# Patient Record
Sex: FEMALE | Race: WHITE | HISPANIC OR LATINO | Employment: OTHER
[De-identification: names, ages, dates, MRNs, and addresses within clinical notes are randomized per-mention and may not be internally consistent; named-entity substitution may affect disease eponyms.]

---

## 2017-04-25 ENCOUNTER — HOSPITAL (OUTPATIENT)
Dept: OTHER | Age: 77
End: 2017-04-25
Attending: INTERNAL MEDICINE

## 2022-04-02 ENCOUNTER — IMAGING SERVICES (OUTPATIENT)
Dept: OTHER | Age: 82
End: 2022-04-02

## 2022-04-02 ENCOUNTER — EXTERNAL RECORD (OUTPATIENT)
Dept: OTHER | Age: 82
End: 2022-04-02

## 2022-10-29 ENCOUNTER — IMAGING SERVICES (OUTPATIENT)
Dept: OTHER | Age: 82
End: 2022-10-29

## 2022-10-29 ENCOUNTER — EXTERNAL RECORD (OUTPATIENT)
Dept: OTHER | Age: 82
End: 2022-10-29

## 2023-05-13 ENCOUNTER — EXTERNAL RECORD (OUTPATIENT)
Dept: OTHER | Age: 83
End: 2023-05-13

## 2023-05-13 ENCOUNTER — IMAGING SERVICES (OUTPATIENT)
Dept: OTHER | Age: 83
End: 2023-05-13

## 2023-06-03 ENCOUNTER — EXTERNAL LAB (OUTPATIENT)
Dept: HEALTH INFORMATION MANAGEMENT | Facility: OTHER | Age: 83
End: 2023-06-03

## 2023-06-03 ENCOUNTER — EXTERNAL RECORD (OUTPATIENT)
Dept: HEALTH INFORMATION MANAGEMENT | Facility: OTHER | Age: 83
End: 2023-06-03

## 2023-06-03 LAB
ALBUMIN SERPL-MCNC: 4.3 G/DL (ref 3.6–4.6)
ALBUMIN/GLOB SERPL: 1.6 {RATIO} (ref 1.2–2.2)
ALP SERPL-CCNC: 151 U/L (ref 44–121)
ALT SERPL-CCNC: 37 U/L (ref 0–32)
AST SERPL-CCNC: 41 U/L (ref 0–40)
BILIRUB SERPL-MCNC: 0.7 MG/DL (ref 0–1.2)
BUN SERPL-MCNC: 22 MG/DL (ref 8–27)
BUN/CREAT SERPL: 23 (ref 12–28)
CALCIUM SERPL-MCNC: 9.8 MG/DL (ref 8.7–10.3)
CHLORIDE SERPL-SCNC: 97 MMOL/L (ref 96–106)
CO2 SERPL-SCNC: 21 MMOL/L (ref 20–29)
CREAT SERPL-MCNC: 0.97 MG/DL (ref 0.57–1)
GFR SERPLBLD SCHWARTZ-ARVRAT: 58 ML/MIN/1.73
GLOBULIN SER-MCNC: 2.7 G/DL (ref 1.5–4.5)
GLUCOSE SERPL-MCNC: 143 MG/DL (ref 70–99)
HCV AB SER QL: NON REACTIVE
LENGTH OF FAST TIME PATIENT: ABNORMAL H
POTASSIUM SERPL-SCNC: 4.3 MMOL/L (ref 3.5–5.2)
PROT SERPL-MCNC: 7 G/DL (ref 6–8.5)
SODIUM SERPL-SCNC: 134 MMOL/L (ref 134–144)

## 2023-06-22 ENCOUNTER — APPOINTMENT (OUTPATIENT)
Dept: SURGERY | Age: 83
End: 2023-06-22

## 2023-07-06 ENCOUNTER — LAB SERVICES (OUTPATIENT)
Dept: LAB | Age: 83
End: 2023-07-06

## 2023-07-06 ENCOUNTER — OFFICE VISIT (OUTPATIENT)
Dept: SURGERY | Age: 83
End: 2023-07-06

## 2023-07-06 VITALS
OXYGEN SATURATION: 96 % | SYSTOLIC BLOOD PRESSURE: 121 MMHG | DIASTOLIC BLOOD PRESSURE: 72 MMHG | BODY MASS INDEX: 33.32 KG/M2 | HEIGHT: 65 IN | HEART RATE: 96 BPM | WEIGHT: 200 LBS

## 2023-07-06 DIAGNOSIS — K82.8 THICKENING OF WALL OF GALLBLADDER: ICD-10-CM

## 2023-07-06 DIAGNOSIS — K82.8 THICKENING OF WALL OF GALLBLADDER: Primary | ICD-10-CM

## 2023-07-06 PROBLEM — R10.84 GENERALIZED ABDOMINAL PAIN: Status: ACTIVE | Noted: 2023-07-06

## 2023-07-06 LAB
ALBUMIN SERPL-MCNC: 3.9 G/DL (ref 3.6–5.1)
ALP SERPL-CCNC: 172 UNITS/L (ref 45–117)
ALT SERPL-CCNC: 46 UNITS/L
AST SERPL-CCNC: 36 UNITS/L
BILIRUB CONJ SERPL-MCNC: 0.2 MG/DL (ref 0–0.2)
BILIRUB SERPL-MCNC: 0.4 MG/DL (ref 0.2–1)
PROT SERPL-MCNC: 7.6 G/DL (ref 6.4–8.2)

## 2023-07-06 PROCEDURE — 80076 HEPATIC FUNCTION PANEL: CPT | Performed by: CLINICAL MEDICAL LABORATORY

## 2023-07-06 PROCEDURE — 36415 COLL VENOUS BLD VENIPUNCTURE: CPT | Performed by: CLINICAL MEDICAL LABORATORY

## 2023-07-06 PROCEDURE — 99244 OFF/OP CNSLTJ NEW/EST MOD 40: CPT

## 2023-07-06 RX ORDER — ATORVASTATIN CALCIUM 20 MG/1
20 TABLET, FILM COATED ORAL DAILY
COMMUNITY
Start: 2023-06-12

## 2023-07-06 RX ORDER — LISINOPRIL 5 MG/1
5 TABLET ORAL DAILY
COMMUNITY
Start: 2023-06-11

## 2023-07-06 RX ORDER — OXYBUTYNIN CHLORIDE 10 MG/1
10 TABLET, EXTENDED RELEASE ORAL DAILY
COMMUNITY
Start: 2023-06-12

## 2023-07-06 RX ORDER — HYDROCHLOROTHIAZIDE 12.5 MG/1
12.5 TABLET ORAL DAILY
COMMUNITY
Start: 2023-06-20

## 2023-07-06 ASSESSMENT — ENCOUNTER SYMPTOMS
NEUROLOGICAL NEGATIVE: 1
CONSTITUTIONAL NEGATIVE: 1
GASTROINTESTINAL NEGATIVE: 1
RESPIRATORY NEGATIVE: 1
PSYCHIATRIC NEGATIVE: 1
EYES NEGATIVE: 1

## 2023-07-06 ASSESSMENT — PAIN SCALES - GENERAL: PAINLEVEL: 0

## 2023-07-11 ENCOUNTER — TELEPHONE (OUTPATIENT)
Dept: SURGERY | Age: 83
End: 2023-07-11

## 2023-08-08 ENCOUNTER — EXTERNAL RECORD (OUTPATIENT)
Dept: OTHER | Age: 83
End: 2023-08-08

## 2023-08-08 ENCOUNTER — IMAGING SERVICES (OUTPATIENT)
Dept: OTHER | Age: 83
End: 2023-08-08

## 2023-11-15 ENCOUNTER — EXTERNAL RECORD (OUTPATIENT)
Dept: OTHER | Age: 83
End: 2023-11-15

## 2023-11-15 ENCOUNTER — IMAGING SERVICES (OUTPATIENT)
Dept: OTHER | Age: 83
End: 2023-11-15

## 2023-12-13 ENCOUNTER — TELEPHONE (OUTPATIENT)
Dept: CT IMAGING | Age: 83
End: 2023-12-13

## 2023-12-22 DIAGNOSIS — N63.20 LEFT BREAST MASS: Primary | ICD-10-CM

## 2023-12-26 ENCOUNTER — TELEPHONE (OUTPATIENT)
Dept: CT IMAGING | Age: 83
End: 2023-12-26

## 2024-01-03 ENCOUNTER — TELEPHONE (OUTPATIENT)
Dept: CT IMAGING | Age: 84
End: 2024-01-03

## 2024-01-04 ENCOUNTER — HOSPITAL ENCOUNTER (OUTPATIENT)
Dept: CT IMAGING | Age: 84
Discharge: HOME | End: 2024-01-04
Attending: SPECIALIST

## 2024-01-04 DIAGNOSIS — R92.8 ABNORMALITY OF LEFT BREAST ON SCREENING MAMMOGRAM: ICD-10-CM

## 2024-01-04 PROCEDURE — 10006027 HB SUPPLY 278

## 2024-01-04 PROCEDURE — 10002801 HB RX 250 W/O HCPCS: Performed by: RADIOLOGY

## 2024-01-04 PROCEDURE — 76642 ULTRASOUND BREAST LIMITED: CPT

## 2024-01-04 PROCEDURE — A4648 IMPLANTABLE TISSUE MARKER: HCPCS

## 2024-01-04 PROCEDURE — 19083 BX BREAST 1ST LESION US IMAG: CPT

## 2024-01-04 PROCEDURE — 77065 DX MAMMO INCL CAD UNI: CPT

## 2024-01-04 PROCEDURE — 10006023 HB SUPPLY 272

## 2024-01-04 RX ORDER — LIDOCAINE HYDROCHLORIDE 20 MG/ML
INJECTION, SOLUTION INFILTRATION; PERINEURAL PRN
Status: COMPLETED | OUTPATIENT
Start: 2024-01-04 | End: 2024-01-04

## 2024-01-04 RX ADMIN — LIDOCAINE HYDROCHLORIDE 3 ML: 20 INJECTION, SOLUTION INFILTRATION; PERINEURAL at 08:13

## 2024-01-08 LAB
ASR DISCLAIMER: NORMAL
CASE RPRT: NORMAL
CLINICAL INFO: NORMAL
PATH REPORT ADDENDUM.SYNOPTIC DOC: NORMAL
PATH REPORT.FINAL DX SPEC: NORMAL
PATH REPORT.GROSS SPEC: NORMAL

## 2024-01-09 ENCOUNTER — TELEPHONE (OUTPATIENT)
Dept: CT IMAGING | Age: 84
End: 2024-01-09

## 2024-01-09 ENCOUNTER — CANCER NAVIGATOR (OUTPATIENT)
Dept: ONCOLOGY | Age: 84
End: 2024-01-09

## 2024-01-15 ENCOUNTER — TELEPHONE (OUTPATIENT)
Dept: HEMATOLOGY/ONCOLOGY | Facility: HOSPITAL | Age: 84
End: 2024-01-15

## 2024-01-15 NOTE — TELEPHONE ENCOUNTER
Attempted to reach patient to discuss scheduling consultation with Dr. Arora.  Contact number rings and then goes to busy signal.  Will attempt again at a later time.

## 2024-01-17 ENCOUNTER — NURSE NAVIGATOR ENCOUNTER (OUTPATIENT)
Dept: HEMATOLOGY/ONCOLOGY | Facility: HOSPITAL | Age: 84
End: 2024-01-17

## 2024-01-17 ENCOUNTER — OFFICE VISIT (OUTPATIENT)
Dept: SURGERY | Facility: CLINIC | Age: 84
End: 2024-01-17
Payer: MEDICARE

## 2024-01-17 VITALS
HEART RATE: 94 BPM | DIASTOLIC BLOOD PRESSURE: 78 MMHG | SYSTOLIC BLOOD PRESSURE: 139 MMHG | OXYGEN SATURATION: 96 % | WEIGHT: 204 LBS | TEMPERATURE: 98 F | RESPIRATION RATE: 18 BRPM

## 2024-01-17 DIAGNOSIS — D05.12 DUCTAL CARCINOMA IN SITU (DCIS) OF LEFT BREAST: Primary | ICD-10-CM

## 2024-01-17 RX ORDER — HYDROCHLOROTHIAZIDE 12.5 MG/1
12.5 TABLET ORAL DAILY
COMMUNITY

## 2024-01-17 RX ORDER — OMEPRAZOLE 20 MG/1
20 CAPSULE, DELAYED RELEASE ORAL
COMMUNITY

## 2024-01-17 RX ORDER — ATORVASTATIN CALCIUM 20 MG/1
20 TABLET, FILM COATED ORAL DAILY
COMMUNITY

## 2024-01-17 RX ORDER — MELATONIN
1000 DAILY
COMMUNITY

## 2024-01-17 RX ORDER — LISINOPRIL 5 MG/1
5 TABLET ORAL DAILY
COMMUNITY

## 2024-01-17 NOTE — PROGRESS NOTES
Breast Surgery New Patient Consultation    This is the first visit for this 83 year old woman, referred by Dr. 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    History of Present Illness:   Ms. Mercy Ayala is a 83 year old woman who presents with imaging detected left breast DCIS.  She denies any palpable masses, nipple discharge, skin changes or axillary symptoms.  She does not have a known family history of breast cancer.  She has no personal prior history of breast disease or biopsies.  Her workup was conducted at an outside facility.  She had a bilateral screening mammogram in 2023 that was unremarkable.  She had been followed remotely for a mass in the left breast that was thought to be benign.  She presented for her surveillance ultrasound of this mass on November 15, 2023 and was found to have interval enlargement of this mass up to 2 cm from 1.5 cm prior.  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  She is here today for evaluation and recommendations for further therapy.        Past Medical History:   Diagnosis Date    Diabetes (HCC)     Heart burn     Hyperlipidemia     IBS (irritable bowel syndrome)        No past surgical history on file.    Gynecological History:  Pt is a   Pt was 30 years old at time of first pregnancy.    She denies any cumulative breastfeeding history.  She achieved menarche at age 13 and LMP unknown  Age of Menopause: unknown  Type: Hysterectomy with ovaries left in  She denies any history of hormone replacement therapy  She denies any history of oral contraceptive use.  She denies infertility treatment to achieve pregnancy.    Medications:    No outpatient medications have been marked as taking for the 24 encounter (Office Visit) with Mirian Arora MD.       Allergies:    Not on  File    Family History:   Family History   Problem Relation Age of Onset    Ovarian Cancer Mother 59       She is not of Ashkenazi Nondenominational ancestry.    Social History:  History   Alcohol use Not on file       History   Smoking Status    Not on file   Smokeless Tobacco    Not on file     Ms. Mercy Ayala is  with 2 children. She has 1 siblings. She is currently Retired      Review of Systems:  General:   The patient denies, fever, chills, night sweats, fatigue, generalized weakness, change in appetite or weight loss.    HEENT:     The patient denies eye irritation, cataracts, redness, glaucoma, yellowing of the eyes, change in vision or color blindness. The patient denies hearing loss, ringing in the ears, ear drainage, earaches, nasal congestion, nose bleeds, snoring, pain in mouth/throat, hoarseness, change in voice, facial trauma.    Respiratory:  The patient denies chronic cough, phlegm, hemoptysis, pleurisy/chest pain, pneumonia, asthma, wheezing, difficulty in breathing with exertion, emphysema, chronic bronchitis, shortness of breath or abnormal sound when breathing.     Cardiovascular:  There is no history of chest pain, chest pressure/discomfort, palpitations, irregular heartbeat, fainting or near-fainting, difficulty breathing when lying flat, SOB/Coughing at night, swelling of the legs or chest pain while walking.    Breasts:  See history of present illness    Gastrointestinal:     There is no history of difficulty or pain with swallowing, reflux symptoms, vomiting, dark or bloody stools, constipation, yellowing of the skin, indigestion, nausea, change in bowel habits, diarrhea, abdominal pain or vomiting blood.     Genitourinary:  The patient denies frequent urination, needing to get up at night to urinate, urinary hesitancy or retaining urine, painful urination, urinary incontinence, decreased urine stream, blood in the urine or vaginal/penile discharge.    Skin:    The patient denies rash,  itching, skin lesions, dry skin, change in skin color or change in moles.     Hematologic/Lymphatic:  The patient denies easily bruising or bleeding or persistent swollen glands or lymph nodes.     Musculoskeletal:  The patient denies muscle aches/pain, joint pain, stiff joints, neck pain, back pain or bone pain.    Neuropsychiatric:  There is no history of migraines or severe headaches, seizure/epilepsy, speech problems, coordination problems, trembling/tremors, fainting/black outs, dizziness, memory problems, loss of sensation/numbness, problems walking, weakness, tingling or burning in hands/feet. There is no history of abusive relationship, bipolar disorder, sleep disturbance, anxiety, depression or feeling of despair.    Endocrine:    There is no history of poor/slow wound healing, weight loss/gain, fertility or hormone problems, cold intolerance, thyroid disease.     Allergic/Immunologic:  There is no history of hives, hay fever, angioedema or anaphylaxis.    /78 (BP Location: Right arm, Patient Position: Sitting, Cuff Size: adult)   Pulse 94   Temp 98 °F (36.7 °C) (Temporal)   Resp 18   Wt 92.5 kg (204 lb)   SpO2 96%     Physical Exam:  The patient is an alert, oriented, well-nourished and  well-developed woman who appears her stated age. Her speech patterns and movements are normal. Her affect is appropriate.    HEENT: The head is normocephalic. The neck is supple. The thyroid is not enlarged and is without palpable masses/nodules. There are no palpable masses. The trachea is in the midline. Conjunctiva are clear, non-icteric.    Chest: The chest expands symmetrically. The lungs are clear to auscultation.    Heart: The rhythm is regular.  There are no murmurs, rubs, gallops or thrills.    Breasts:  Her breasts are symmetrical with a cup size 40B.  Right breast: The skin, nipple ,and areola appear normal. There is no skin dimpling with movement of the pectoralis. There is no nipple retraction. No  nipple discharge can be elicited. The parenchyma is mildly nodular. There are no dominant masses in the breast. The axillary tail is normal.  Left breast:   The skin, nipple, and areola appear normal. There is no skin dimpling with movement of the pectoralis. There is no nipple retraction. No nipple discharge can be elicited. The parenchyma is mildly nodular. There are no dominant masses in the breast. The axillary tail is normal.    Abdomen:  The abdomen is soft, flat and non tender. The liver is not enlarged. There are no palpable masses.    Lymph Nodes:  The supraclavicular, axillary and cervical regions are free of significant lymphadenopathy.    Back: There is no vertebral column tenderness.    Skin: The skin appears normal. There are no suspicious appearing rashes or lesions.    Extremities: The extremities are without deformity, cyanosis or edema.    Impression:   Ms. Mercy Ayala is a 83 year old woman presents with biopsy confirmed left breast DCIS, clinical stage Tis NxMx.    Discussion and Plan:  I had a discussion with the Patient regarding her breast exam. On exam today I found her to be healing well since recent biopsy with no other clinical findings.  I first reviewed the recent imaging and pathology and we discussed this at length.    The natural history and evolution of DCIS was discussed with Ms. Mercy Ayala and her family. This  included the difference between in-situ and invasive carcinoma, and the distinction between  local and systemic disease and local and systemic therapy. For local treatment options, I  explained the risks and benefits of breast conservation and mastectomy (with or without  reconstruction), including the fact that survival rates are essentially equal with these two  approaches. If breast conservation is elected, I explained the need for free margins, the  possibility of re-excision to achieve free margins, and the need for post-operative radiotherapy.  The patient was  told that eunice staging is not usually required for DCIS, but is sometimes  recommended depending on the size and grade of the lesion, and if mastectomy is planned  for treatment. The reasons for this were explained. I explained that for pure DCIS, systemic  therapy is not required, although endocrine agents such as tamoxifen can be used in women  with hormone sensitive disease in order to reduce the risk of local recurrence and future new  primaries  Following this discussion, where all of the patient's questions were answered, we agreed to  proceed with left breast wire localized lumpectomy. The risks and possible complications of the procedure were explained to the patient and her family and she understood and agreed to the proposed plan. She was given ample opportunity for questions and those questions were answered to her satisfaction. She has been  encouraged to contact the office with any questions or concerns prior to her next appointment.

## 2024-01-17 NOTE — PATIENT INSTRUCTIONS
Dr. Mirian Arora  Tel: [phone]  Fax: [phone] [facility]  [address] [place] [address]  [phone]     Surgery/Procedure: Left breast wire localized lumpectomy     Anesthesia:   Gen  Surgery Length:   45 minutes CPT:  39072   Wire LOC:   Yes Nuc Med:   No   Viki Seed:  No       Dx & ICD-10: Ductal carcinoma in situ (DCIS) of left breast (D05.12)   Radiology Instructions: Left breast, 3 o'clock position, venus shaped clip, biopsy demonstrates ductal carcinoma in situ.    _______________________________________________________________________________    Someone must accompany you the day of the procedure to drive you home safely, because of anesthesia.  You will need an adult  to stay with you the first night following your surgery.  You must remove any kind of makeup, acrylic nails, lotions, powders, creams or deodorant.  [facility] ONLY: Pre-admission will give instruct you on when to take Gatorade and Tylenol/acetaminophen prior to your surgery, purchase 2 - 12oz bottles of regular Gatorade (NOT RED/SUGAR FREE). Otherwise, you may not eat or drink anything else after 11PM the night before surgery.    [facility] ONLY: You may not eat or drink anything after midnight the day of your surgery.   Wear comfortable clothing that can be easily removed.  If you wear dentures, contacts lenses, or any prosthesis, you will be asked to remove them.  Do not drink alcohol or smoke 24 hours prior to your procedure.  Bring a picture ID and your insurance card.  Covid-19 testing is no longer required before surgery unless you are experiencing symptoms such as fever, cough, congestion, etc.   The Pre-Admission Testing Department will call the day before to confirm your procedure, give you the time you need to arrive by and directions on where to go. They begin making calls after 2pm, if you are not contacted by 4pm, please call the surgeon's office listed above.  Do not take any blood  thinners at least one week prior to the procedure/surgery. This includes aspirin, baby aspirin, Ibuprofen products, herbal supplements, diet medications, vitamin E, fish oil and green tea supplements. Please check other supplements for these ingredients. *TYLENOL or acetaminophen is acceptable*  If you take Coumadin, Plavix, Xarelto, or Eliquis, please contact your prescribing physician for special instructions on how long to hold. If you take insulin contact your primary care physician for special instructions.  Our surgery scheduler, Tonya, will be contacting you to discuss surgery dates. If you have any questions related to scheduling your surgery, please reach out to her at [phone].  _____________________________________________________________________  PRE-OPERATIVE TESTING IF INDICATED BELOW  PLEASE COMPLETE ASAP (AT LEAST 14-21 DAYS PRIOR TO SURGERY)  [] CBC [x] BMP [] CMP [x] EKG    [] PT, PTT, INR [] Cardiac Clearance  [x] H&P Medical Clearance [] Chest X-ray     Please call Central Scheduling to schedule an appointment for pre-operative labs/tests @ [phone]  Does the patient have a pacemaker or ICD?           Does the patient have sleep apnea?  [] Yes   [x] No                               [] Yes   [x] No

## 2024-01-18 ENCOUNTER — TELEPHONE (OUTPATIENT)
Dept: SURGERY | Facility: CLINIC | Age: 84
End: 2024-01-18

## 2024-01-18 DIAGNOSIS — D05.12 DUCTAL CARCINOMA IN SITU OF LEFT BREAST: Primary | ICD-10-CM

## 2024-01-18 NOTE — TELEPHONE ENCOUNTER
Calling pt daughter in regards to scheduling surgery.  Informed pt that I have 03/18/2024 available at [facility] with Dr. Arora.  Pt verbalized understanding and in agreement with date and location.  All questions answered.   Encouraged pt to call or [place] message office with any other questions or concerns.

## 2024-01-19 PROBLEM — D05.12 DUCTAL CARCINOMA IN SITU (DCIS) OF LEFT BREAST: Status: ACTIVE | Noted: 2024-01-19

## 2024-01-22 ENCOUNTER — TELEPHONE (OUTPATIENT)
Dept: HEMATOLOGY/ONCOLOGY | Facility: HOSPITAL | Age: 84
End: 2024-01-22

## 2024-01-22 DIAGNOSIS — D05.12 DUCTAL CARCINOMA IN SITU (DCIS) OF LEFT BREAST: Primary | ICD-10-CM

## 2024-01-22 NOTE — TELEPHONE ENCOUNTER
Phoned patient's daughter Deb to discuss care coordination.  Patient was seen for Medical Oncology consultation with Dr. REINALDO Henry.  Deb shares communication from Dr. Henry recommending further work-up of the right breast, as recommended by [facility] on recent breast biopsy results.    Deb has scheduled imaging at [facility] for Wed of this week.  Discussed with Deb having breast imaging at [facility], as this will be where surgery has been scheduled.  Deb agreeable.  Appointment scheduled for Wed 1/24/24 at 1:20.  Instructed to arrive at 1pm for registration.  Shared with Deb [facility] contacted and breast imaging records for 2019, 2017 requested for care continuation.

## 2024-01-24 ENCOUNTER — HOSPITAL ENCOUNTER (OUTPATIENT)
Dept: ULTRASOUND IMAGING | Facility: HOSPITAL | Age: 84
Discharge: HOME | End: 2024-01-24
Attending: SURGERY
Payer: MEDICARE

## 2024-01-24 ENCOUNTER — HOSPITAL ENCOUNTER (OUTPATIENT)
Dept: MAMMOGRAPHY | Facility: HOSPITAL | Age: 84
Discharge: HOME | End: 2024-01-24
Attending: SURGERY
Payer: MEDICARE

## 2024-01-24 ENCOUNTER — TELEPHONE (OUTPATIENT)
Dept: HEMATOLOGY/ONCOLOGY | Facility: HOSPITAL | Age: 84
End: 2024-01-24

## 2024-01-24 DIAGNOSIS — D05.12 DUCTAL CARCINOMA IN SITU OF LEFT BREAST: ICD-10-CM

## 2024-01-24 DIAGNOSIS — D05.12 DUCTAL CARCINOMA IN SITU (DCIS) OF LEFT BREAST: ICD-10-CM

## 2024-01-24 DIAGNOSIS — D05.12 DUCTAL CARCINOMA IN SITU OF LEFT BREAST: Primary | ICD-10-CM

## 2024-01-24 PROCEDURE — 76642 ULTRASOUND BREAST LIMITED: CPT | Performed by: SURGERY

## 2024-01-24 PROCEDURE — 77062 BREAST TOMOSYNTHESIS BI: CPT | Performed by: SURGERY

## 2024-01-24 PROCEDURE — 77066 DX MAMMO INCL CAD BI: CPT | Performed by: SURGERY

## 2024-01-24 NOTE — TELEPHONE ENCOUNTER
Call received from patient's daughter Deb with questions regarding breast imaging.  Patient was scheduled for Right breast diagnostic mammogram today at 1:20.  Patient's exam was changed to a bilateral diagnostic and Deb inquiring as to why.  Shared with Deb, inquiry placed with Breast Radiologist.  Feedback, was there were concerns regarding the quality of outside imaging, and Radiologist wanted to repeat imaging for that purpose.  Deb acknowledged.  She requested to be present when results relayed to patient.  This request was communicated to Radiologist.

## 2024-01-29 NOTE — PROGRESS NOTES
[facility] with Dr. Arora.  Patient is accompanied by her daughter, Deb for support.    Plan to proceed with left wire localized lumpectomy.    Introduced myself to patient and daughter as Breast RN Navigator.  Shared my role to provide support and education, assist with care coordination, as well as connect her to supportive resources as she may need them.    Patient resides in [place].  She provides care for her spouse.  She is supported by her daughter Deb who is a teacher.  Deb provides transportation to and from medical appointments.    Provided with a Journey Map and educated as to anticipated clinical course of care.  Provided with Breast Cancer Treatment Handbook and educated as to how to use this resource.  Provided information for supportive resources in the community.    Patient has been referred by Dr. Sosa to Dr. REINALDO Henry, Medical Oncologist.  Patient to have consultation.    Deb has shared preference to schedule surgery during her Spring Break so that she can be present to support patient during her post operative recovery.  This was shared with Dr. Arora's staff.    Provided patient and daughter with my card and contact information.  Encouraged to call or message with questions, concerns, or needs.

## 2024-03-05 ENCOUNTER — TELEPHONE (OUTPATIENT)
Dept: SURGERY | Facility: CLINIC | Age: 84
End: 2024-03-05

## 2024-03-05 NOTE — TELEPHONE ENCOUNTER
Called and spoke with patient's daughter Karissa. Patient has appt to see her PCP for surgery clearance on 3/9/24. Clearance letter re-routed to PCP office and sent to pt's my chart. All questions answered and daughter verbalizes understanding.

## 2024-03-12 ENCOUNTER — TELEPHONE (OUTPATIENT)
Dept: SURGERY | Facility: CLINIC | Age: 84
End: 2024-03-12

## 2024-03-12 NOTE — TELEPHONE ENCOUNTER
Called and spoke with Renee at Dr. Sosa's office. Discussed that we needed H/P & med clearance, Labs and EKG faxed over to our office soon because pt is having surgery on Monday. Our fax number provided and Renee said she would send fax.

## 2024-03-12 NOTE — TELEPHONE ENCOUNTER
Received H/P and lab work from Dr. Ian monte but missing EKG tracing. Called Dr. Ian monte to request EKG tracing. Fax number provided.

## 2024-03-13 ENCOUNTER — TELEPHONE (OUTPATIENT)
Dept: SURGERY | Facility: CLINIC | Age: 84
End: 2024-03-13

## 2024-03-13 NOTE — TELEPHONE ENCOUNTER
Received EKG tracing from Dr. Sosa office. Faxed H/P, labs, and EKG tracing to [facility] with fax confirmation received.

## 2024-03-15 ENCOUNTER — TELEPHONE (OUTPATIENT)
Dept: SURGERY | Facility: CLINIC | Age: 84
End: 2024-03-15

## 2024-03-15 NOTE — TELEPHONE ENCOUNTER
Called and spoke with Luz from [facility]. Discussed that H/p and labs were scanned into media on 3/13 but EKG that was faxed 3/13 and 3/15 has not been scanned into chart. Per Luz she has the EKG with the paper copy of the patient's chart. Luz did say that she would have the  scan the EKG into media in [facility].

## 2024-03-15 NOTE — DISCHARGE INSTRUCTIONS
Breast Surgery  Post-operative Instructions  Excisional Biopsy, Lumpectomy, Mastectomy, [place] Node Biopsy, or Axillary  Lymph Node Dissection  Mirian Arora MD  General Instructions  The following instructions will provide helpful information that will assist your recovery. These are designed to be general guidelines. Please remember that everyone heals and recovers differently. Listen to your body and rest when you are tired. If you have any questions or concerns, please do not hesitate to contact my office. I would like to see you in the office about one week after surgery, please schedule and appointment through my office to make a post-operative appointment if you do not already have one.     Restrictions  There are no lifting weight restrictions for the arm on the surgical side. You may gradually increase the amount of weight based on your comfort level. You should avoid a lot of repetitious activity with the arm until the drain is out (if one was placed) and the wound is well-healed (about two weeks).   You should not drive a car until you believe you can react to an emergency situation and you’re no longer taking narcotic pain medications.   You may shower the day after surgery. You should not bathe or swim (i.e. submerge wound) until the wound is well healed (about two weeks).  There are no dietary restrictions.    Exercise  You may begin arm exercises within a couple days. Do these 2 or 3 times per day, beginning with light exercise and gradually increase your range of motion and repetitions. This will help your arm regain full mobility. We will address your activity level again at your post-operative visit.   You will have pain medication prescribed before discharge. Take this as directed to relieve pain. It is important that you be comfortable so that you may continue your stretching exercises.   If you find the medication prescribed is too strong, try Tylenol (Acetaminophen) or  Ibuprofen.    Wound Care  You may remove the gauze dressing on the first or second postoperative day and then shower. You should leave the steri-strips in place; they will start to peel off about 10 days after your surgery. The stitches are all underneath the skin and will dissolve on their own. You will not need any stitches removed except if you have a drain in place.  I encourage you to shower once the outer bandage is removed, you may use soap and water directly over the steri-strips and pat dry following.  You should keep gauze dressing on the wound until the wound is completely dry and without drainage-usually 1-3 days.   If a surgical bra was placed after the surgery, I encourage you to wear it as much as possible during the week following the procedure (including during sleep). Alternatively, you may choose to wear your own bra provided it is comfortable, provides support and does not have an underwire. If the breast doesn’t move it is less painful.  If an elastic bandage was placed around your chest after the surgery you may remove it on the 1st or 2nd day after surgery. If you prefer to leave it on longer, you may.  It is normal to feel a lump in the area of the incisions for up to 6 months. This is part of the healing process. Eventually the breast will return to its normal condition.     Pain Medication  You will be given a prescription for a narcotic pain medication (usually Norco) upon discharge. Many patients have very little pain and don’t want to use the narcotic. Don’t be afraid to use it if you’re uncomfortable. If you’d prefer you may substitute Tylenol or Ibuprofen (Motrin, Advil). Using an ice pack for a few minutes over the incision can also alleviate pain. If you do use the narcotic medication, use an over the counter stool softener or gentle laxative and stay well-hydrated as constipation is not uncommon with narcotics.    Pathology Report  The Pathology report is usually available 4-5  business days following the surgery. I will call you  with the results once the report is available.    Notify my office if:   Your temperature is over 101.5 F   You notice increasing swelling, redness, warmth or drainage from around the incision or drain site.    If you experience any problems please call my office and either my nurse or myself will respond. After hours, you will be forwarded to my answering service which will help you get in touch with myself or the physician covering for me.           HOME INSTRUCTIONS  [facility] CARE INSTRUCTIONS: POST-OP ANESTHESIA  The medication that you received for sedation or general anesthesia can last up to 24 hours. Your judgment and reflexes may be altered, even if you feel like your normal self.      We Recommend:   Do not drive any motor vehicle or bicycle   Avoid mowing the lawn, playing sports, or working with power tools/applicances (power saws, electric knives or mixers)   That you have someone stay with you on your first night home   Do not drink alcohol or take sleeping pills or tranquilizers   Do not sign legal documents within 24 hours of your procedure   If you had a nerve block for your surgery, take extra care not to put any pressure on your arm or hand for 24 hours    It is normal:  For you to have a sore throat if you had a breathing tube during surgery (while you were asleep!). The sore throat should get better within 48 hours. You can gargle with warm salt water (1/2 tsp in 4 oz warm water) or use a throat lozenge for comfort  To feel muscle aches or soreness especially in the abdomen, chest or neck. The achy feeling should go away in the next 24 hours  To feel weak, sleepy or \"wiped out\". Your should start feeling better in the next 24 hours.   To experience mild discomforts such as sore lip or tongue, headache, cramps, gas pains or a bloated feeling in your abdomen.   To experience mild back pain or soreness for a day or two if you had spinal or  epidural anesthesia.   If you had laparoscopic surgery, to feel shoulder pain or discomfort on the day of surgery.   For some patients to have nausea after surgery/anesthesia    If you feel nausea or experience vomiting:   Try to move around less.   Eat less than usual or drink only liquids until the next morning   Nausea should resolve in about 24 hours    If you have a problem when you are at home:    Call your surgeons office   Discharge Instructions: After Your Surgery  You’ve just had surgery. During surgery, you were given medicine called anesthesia to keep you relaxed and free of pain. After surgery, you may have some pain or nausea. This is common. Here are some tips for feeling better and getting well after surgery.   Going home  Your healthcare provider will show you how to take care of yourself when you go home. They'll also answer your questions. Have an adult family member or friend drive you home. For the first 24 hours after your surgery:   Don't drive or use heavy equipment.  Don't make important decisions or sign legal papers.  Take medicines as directed.  Don't drink alcohol.  Have someone stay with you, if needed. They can watch for problems and help keep you safe.  Be sure to go to all follow-up visits with your healthcare provider. And rest after your surgery for as long as your provider tells you to.   Coping with pain  If you have pain after surgery, pain medicine will help you feel better. Take it as directed, before pain becomes severe. Also, ask your healthcare provider or pharmacist about other ways to control pain. This might be with heat, ice, or relaxation. And follow any other instructions your surgeon or nurse gives you.      Stay on schedule with your medicine.     Tips for taking pain medicine  To get the best relief possible, remember these points:   Pain medicines can upset your stomach. Taking them with a little food may help.  Most pain relievers taken by mouth need at least 20  to 30 minutes to start to work.  Don't wait till your pain becomes severe before you take your medicine. Try to time your medicine so that you can take it before starting an activity. This might be before you get dressed, go for a walk, or sit down for dinner.  Constipation is a common side effect of some pain medicines. Call your healthcare provider before taking any medicines such as laxatives or stool softeners to help ease constipation. Also ask if you should skip any foods. Drinking lots of fluids and eating foods such as fruits and vegetables that are high in fiber can also help. Remember, don't take laxatives unless your surgeon has prescribed them.  Drinking alcohol and taking pain medicine can cause dizziness and slow your breathing. It can even be deadly. Don't drink alcohol while taking pain medicine.  Pain medicine can make you react more slowly to things. Don't drive or run machinery while taking pain medicine.  Your healthcare provider may tell you to take acetaminophen to help ease your pain. Ask them how much you're supposed to take each day. Acetaminophen or other pain relievers may interact with your prescription medicines or other over-the-counter (OTC) medicines. Some prescription medicines have acetaminophen and other ingredients in them. Using both prescription and OTC acetaminophen for pain can cause you to accidentally overdose. Read the labels on your OTC medicines with care. This will help you to clearly know the list of ingredients, how much to take, and any warnings. It may also help you not take too much acetaminophen. If you have questions or don't understand the information, ask your pharmacist or healthcare provider to explain it to you before you take the OTC medicine.   Managing nausea  Some people have an upset stomach (nausea) after surgery. This is often because of anesthesia, pain, or pain medicine, less movement of food in the stomach, or the stress of surgery. These tips will  help you handle nausea and eat healthy foods as you get better. If you were on a special food plan before surgery, ask your healthcare provider if you should follow it while you get better. Check with your provider on how your eating should progress. It may depend on the surgery you had. These general tips may help:   Don't push yourself to eat. Your body will tell you when to eat and how much.  Start off with clear liquids and soup. They're easier to digest.  Next try semi-solid foods as you feel ready. These include mashed potatoes, applesauce, and gelatin.  Slowly move to solid foods. Don’t eat fatty, rich, or spicy foods at first.  Don't force yourself to have 3 large meals a day. Instead eat smaller amounts more often.  Take pain medicines with a small amount of solid food, such as crackers or toast. This helps prevent nausea.  When to call your healthcare provider  Call your healthcare provider right away if you have any of these:   You still have too much pain, or the pain gets worse, after taking the medicine. The medicine may not be strong enough. Or there may be a complication from the surgery.  You feel too sleepy, dizzy, or groggy. The medicine may be too strong.  Side effects such as nausea or vomiting. Your healthcare provider may advise taking other medicines to .  Skin changes such as rash, itching, or hives. This may mean you have an allergic reaction. Your provider may advise taking other medicines.  The incision looks different (for instance, part of it opens up).  Bleeding or fluid leaking from the incision site, and weren't told to expect that.  Fever of 100.4°F (38°C) or higher, or as directed by your provider.  Call 911  Call 911 right away if you have:   Trouble breathing  Facial swelling    If you have obstructive sleep apnea   You were given anesthesia medicine during surgery to keep you comfortable and free of pain. After surgery, you may have more apnea spells because of this medicine and  other medicines you were given. The spells may last longer than normal.    At home:  Keep using the continuous positive airway pressure (CPAP) device when you sleep. Unless your healthcare provider tells you not to, use it when you sleep, day or night. CPAP is a common device used to treat obstructive sleep apnea.  Talk with your provider before taking any pain medicine, muscle relaxants, or sedatives. Your provider will tell you about the possible dangers of taking these medicines.  Contact your provider if your sleeping changes a lot even when taking medicines as directed.  Cami last reviewed this educational content on 10/1/2021  © 1189-3008 [employer]. All rights reserved. This information is not intended as a substitute for professional medical care. Always follow your healthcare professional's instructions.

## 2024-03-18 ENCOUNTER — HOSPITAL ENCOUNTER (OUTPATIENT)
Dept: MAMMOGRAPHY | Facility: HOSPITAL | Age: 84
Discharge: HOME | End: 2024-03-18
Attending: SURGERY
Payer: MEDICARE

## 2024-03-18 ENCOUNTER — ANESTHESIA EVENT (OUTPATIENT)
Dept: SURGERY | Facility: HOSPITAL | Age: 84
End: 2024-03-18
Payer: MEDICARE

## 2024-03-18 ENCOUNTER — APPOINTMENT (OUTPATIENT)
Dept: MAMMOGRAPHY | Facility: HOSPITAL | Age: 84
End: 2024-03-18
Attending: SURGERY
Payer: MEDICARE

## 2024-03-18 ENCOUNTER — ANESTHESIA (OUTPATIENT)
Dept: SURGERY | Facility: HOSPITAL | Age: 84
End: 2024-03-18
Payer: MEDICARE

## 2024-03-18 ENCOUNTER — HOSPITAL ENCOUNTER (OUTPATIENT)
Facility: HOSPITAL | Age: 84
Setting detail: HOSPITAL OUTPATIENT SURGERY
Discharge: HOME | End: 2024-03-18
Attending: SURGERY | Admitting: SURGERY
Payer: MEDICARE

## 2024-03-18 VITALS
HEIGHT: 64 IN | WEIGHT: 203 LBS | BODY MASS INDEX: 34.66 KG/M2 | DIASTOLIC BLOOD PRESSURE: 70 MMHG | OXYGEN SATURATION: 93 % | RESPIRATION RATE: 16 BRPM | HEART RATE: 89 BPM | TEMPERATURE: 97 F | SYSTOLIC BLOOD PRESSURE: 122 MMHG

## 2024-03-18 DIAGNOSIS — D05.12 DUCTAL CARCINOMA IN SITU OF LEFT BREAST: ICD-10-CM

## 2024-03-18 DIAGNOSIS — Z01.818 PRE-OP TESTING: Primary | ICD-10-CM

## 2024-03-18 LAB
GLUCOSE BLDC GLUCOMTR-MCNC: 139 MG/DL (ref 70–99)
GLUCOSE BLDC GLUCOMTR-MCNC: 140 MG/DL (ref 70–99)

## 2024-03-18 PROCEDURE — 76098 X-RAY EXAM SURGICAL SPECIMEN: CPT | Performed by: SURGERY

## 2024-03-18 PROCEDURE — 88360 TUMOR IMMUNOHISTOCHEM/MANUAL: CPT | Performed by: SURGERY

## 2024-03-18 PROCEDURE — 19281 PERQ DEVICE BREAST 1ST IMAG: CPT | Performed by: SURGERY

## 2024-03-18 PROCEDURE — 88307 TISSUE EXAM BY PATHOLOGIST: CPT | Performed by: SURGERY

## 2024-03-18 PROCEDURE — 88342 IMHCHEM/IMCYTCHM 1ST ANTB: CPT | Performed by: SURGERY

## 2024-03-18 PROCEDURE — 82962 GLUCOSE BLOOD TEST: CPT

## 2024-03-18 PROCEDURE — 0HBU0ZZ EXCISION OF LEFT BREAST, OPEN APPROACH: ICD-10-PCS | Performed by: SURGERY

## 2024-03-18 RX ORDER — HYDROMORPHONE HYDROCHLORIDE 1 MG/ML
0.2 INJECTION, SOLUTION INTRAMUSCULAR; INTRAVENOUS; SUBCUTANEOUS EVERY 5 MIN PRN
Status: DISCONTINUED | OUTPATIENT
Start: 2024-03-18 | End: 2024-03-18

## 2024-03-18 RX ORDER — METOCLOPRAMIDE HYDROCHLORIDE 5 MG/ML
10 INJECTION INTRAMUSCULAR; INTRAVENOUS ONCE
Status: COMPLETED | OUTPATIENT
Start: 2024-03-18 | End: 2024-03-18

## 2024-03-18 RX ORDER — DEXAMETHASONE SODIUM PHOSPHATE 4 MG/ML
VIAL (ML) INJECTION AS NEEDED
Status: DISCONTINUED | OUTPATIENT
Start: 2024-03-18 | End: 2024-03-18 | Stop reason: SURG

## 2024-03-18 RX ORDER — HYDROCODONE BITARTRATE AND ACETAMINOPHEN 5; 325 MG/1; MG/1
2 TABLET ORAL ONCE AS NEEDED
Status: COMPLETED | OUTPATIENT
Start: 2024-03-18 | End: 2024-03-18

## 2024-03-18 RX ORDER — ACETAMINOPHEN 500 MG
1000 TABLET ORAL ONCE
Status: COMPLETED | OUTPATIENT
Start: 2024-03-18 | End: 2024-03-18

## 2024-03-18 RX ORDER — LIDOCAINE HYDROCHLORIDE 20 MG/ML
INJECTION, SOLUTION EPIDURAL; INFILTRATION; INTRACAUDAL; PERINEURAL AS NEEDED
Status: DISCONTINUED | OUTPATIENT
Start: 2024-03-18 | End: 2024-03-18 | Stop reason: SURG

## 2024-03-18 RX ORDER — DEXTROSE MONOHYDRATE 25 G/50ML
50 INJECTION, SOLUTION INTRAVENOUS
Status: DISCONTINUED | OUTPATIENT
Start: 2024-03-18 | End: 2024-03-18

## 2024-03-18 RX ORDER — ONDANSETRON 2 MG/ML
4 INJECTION INTRAMUSCULAR; INTRAVENOUS EVERY 6 HOURS PRN
Status: DISCONTINUED | OUTPATIENT
Start: 2024-03-18 | End: 2024-03-18

## 2024-03-18 RX ORDER — NALOXONE HYDROCHLORIDE 0.4 MG/ML
80 INJECTION, SOLUTION INTRAMUSCULAR; INTRAVENOUS; SUBCUTANEOUS AS NEEDED
Status: DISCONTINUED | OUTPATIENT
Start: 2024-03-18 | End: 2024-03-18

## 2024-03-18 RX ORDER — MORPHINE SULFATE 10 MG/ML
6 INJECTION, SOLUTION INTRAMUSCULAR; INTRAVENOUS EVERY 10 MIN PRN
Status: DISCONTINUED | OUTPATIENT
Start: 2024-03-18 | End: 2024-03-18

## 2024-03-18 RX ORDER — HYDROMORPHONE HYDROCHLORIDE 1 MG/ML
0.6 INJECTION, SOLUTION INTRAMUSCULAR; INTRAVENOUS; SUBCUTANEOUS EVERY 5 MIN PRN
Status: DISCONTINUED | OUTPATIENT
Start: 2024-03-18 | End: 2024-03-18

## 2024-03-18 RX ORDER — SODIUM CHLORIDE, SODIUM LACTATE, POTASSIUM CHLORIDE, CALCIUM CHLORIDE 600; 310; 30; 20 MG/100ML; MG/100ML; MG/100ML; MG/100ML
INJECTION, SOLUTION INTRAVENOUS CONTINUOUS
Status: DISCONTINUED | OUTPATIENT
Start: 2024-03-18 | End: 2024-03-18

## 2024-03-18 RX ORDER — METOCLOPRAMIDE 10 MG/1
10 TABLET ORAL ONCE
Status: COMPLETED | OUTPATIENT
Start: 2024-03-18 | End: 2024-03-18

## 2024-03-18 RX ORDER — METOCLOPRAMIDE HYDROCHLORIDE 5 MG/ML
10 INJECTION INTRAMUSCULAR; INTRAVENOUS EVERY 8 HOURS PRN
Status: DISCONTINUED | OUTPATIENT
Start: 2024-03-18 | End: 2024-03-18

## 2024-03-18 RX ORDER — FAMOTIDINE 10 MG/ML
20 INJECTION, SOLUTION INTRAVENOUS ONCE
Status: COMPLETED | OUTPATIENT
Start: 2024-03-18 | End: 2024-03-18

## 2024-03-18 RX ORDER — ONDANSETRON 2 MG/ML
INJECTION INTRAMUSCULAR; INTRAVENOUS AS NEEDED
Status: DISCONTINUED | OUTPATIENT
Start: 2024-03-18 | End: 2024-03-18 | Stop reason: SURG

## 2024-03-18 RX ORDER — HYDROCODONE BITARTRATE AND ACETAMINOPHEN 5; 325 MG/1; MG/1
1-2 TABLET ORAL EVERY 6 HOURS PRN
Qty: 20 TABLET | Refills: 0 | Status: SHIPPED | OUTPATIENT
Start: 2024-03-18

## 2024-03-18 RX ORDER — HYDROMORPHONE HYDROCHLORIDE 1 MG/ML
0.4 INJECTION, SOLUTION INTRAMUSCULAR; INTRAVENOUS; SUBCUTANEOUS EVERY 5 MIN PRN
Status: DISCONTINUED | OUTPATIENT
Start: 2024-03-18 | End: 2024-03-18

## 2024-03-18 RX ORDER — NICOTINE POLACRILEX 4 MG
15 LOZENGE BUCCAL
Status: DISCONTINUED | OUTPATIENT
Start: 2024-03-18 | End: 2024-03-18

## 2024-03-18 RX ORDER — BUPIVACAINE HYDROCHLORIDE 5 MG/ML
INJECTION, SOLUTION EPIDURAL; INTRACAUDAL AS NEEDED
Status: DISCONTINUED | OUTPATIENT
Start: 2024-03-18 | End: 2024-03-18 | Stop reason: HOSPADM

## 2024-03-18 RX ORDER — CEFAZOLIN SODIUM/WATER 2 G/20 ML
2 SYRINGE (ML) INTRAVENOUS ONCE
Status: COMPLETED | OUTPATIENT
Start: 2024-03-18 | End: 2024-03-18

## 2024-03-18 RX ORDER — NICOTINE POLACRILEX 4 MG
30 LOZENGE BUCCAL
Status: DISCONTINUED | OUTPATIENT
Start: 2024-03-18 | End: 2024-03-18

## 2024-03-18 RX ORDER — MORPHINE SULFATE 4 MG/ML
4 INJECTION, SOLUTION INTRAMUSCULAR; INTRAVENOUS EVERY 10 MIN PRN
Status: DISCONTINUED | OUTPATIENT
Start: 2024-03-18 | End: 2024-03-18

## 2024-03-18 RX ORDER — MORPHINE SULFATE 4 MG/ML
2 INJECTION, SOLUTION INTRAMUSCULAR; INTRAVENOUS EVERY 10 MIN PRN
Status: DISCONTINUED | OUTPATIENT
Start: 2024-03-18 | End: 2024-03-18

## 2024-03-18 RX ORDER — LIDOCAINE HYDROCHLORIDE AND EPINEPHRINE 10; 10 MG/ML; UG/ML
INJECTION, SOLUTION INFILTRATION; PERINEURAL AS NEEDED
Status: DISCONTINUED | OUTPATIENT
Start: 2024-03-18 | End: 2024-03-18 | Stop reason: HOSPADM

## 2024-03-18 RX ORDER — FAMOTIDINE 20 MG/1
20 TABLET, FILM COATED ORAL ONCE
Status: COMPLETED | OUTPATIENT
Start: 2024-03-18 | End: 2024-03-18

## 2024-03-18 RX ORDER — HYDROCODONE BITARTRATE AND ACETAMINOPHEN 5; 325 MG/1; MG/1
1 TABLET ORAL ONCE AS NEEDED
Status: COMPLETED | OUTPATIENT
Start: 2024-03-18 | End: 2024-03-18

## 2024-03-18 RX ORDER — ACETAMINOPHEN 500 MG
1000 TABLET ORAL ONCE AS NEEDED
Status: COMPLETED | OUTPATIENT
Start: 2024-03-18 | End: 2024-03-18

## 2024-03-18 RX ADMIN — SODIUM CHLORIDE, SODIUM LACTATE, POTASSIUM CHLORIDE, CALCIUM CHLORIDE: 600; 310; 30; 20 INJECTION, SOLUTION INTRAVENOUS at 08:46:00

## 2024-03-18 RX ADMIN — DEXAMETHASONE SODIUM PHOSPHATE 4 MG: 4 MG/ML VIAL (ML) INJECTION at 08:51:00

## 2024-03-18 RX ADMIN — ONDANSETRON 4 MG: 2 INJECTION INTRAMUSCULAR; INTRAVENOUS at 08:51:00

## 2024-03-18 RX ADMIN — CEFAZOLIN SODIUM/WATER 2 G: 2 G/20 ML SYRINGE (ML) INTRAVENOUS at 09:02:00

## 2024-03-18 RX ADMIN — SODIUM CHLORIDE, SODIUM LACTATE, POTASSIUM CHLORIDE, CALCIUM CHLORIDE: 600; 310; 30; 20 INJECTION, SOLUTION INTRAVENOUS at 09:29:00

## 2024-03-18 RX ADMIN — LIDOCAINE HYDROCHLORIDE 40 MG: 20 INJECTION, SOLUTION EPIDURAL; INFILTRATION; INTRACAUDAL; PERINEURAL at 08:51:00

## 2024-03-18 NOTE — ANESTHESIA PROCEDURE NOTES
Airway  Date/Time: 3/18/2024 8:54 AM  Urgency: Elective      General Information and Staff    Patient location during procedure: OR  Anesthesiologist: Dora Macedo MD  Resident/CRNA: Alix Johnson CRNA  Performed: CRNA   Performed by: Alix Johnson CRNA  Authorized by: Dora Macedo MD      Indications and Patient Condition  Indications for airway management: anesthesia  Sedation level: deep  Preoxygenated: yes  Patient position: sniffing  Mask difficulty assessment: 0 - not attempted    Final Airway Details  Final airway type: supraglottic airway      Successful airway: classic  Size 3       Number of attempts at approach: 1    Additional Comments  Easy, atraumatic placement of LMA. Soft bite block to right molars. Dentition, lips and mucosa unchanged.

## 2024-03-18 NOTE — ANESTHESIA PREPROCEDURE EVALUATION
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]    Relevant Problems   No relevant active problems       NPO:  Last Liquid Consumption Date: 03/18/24  Last Liquid Consumption Time: 0400 (sips of water with meds)  Last Solid Consumption Date: 03/17/24  Last Solid Consumption Time: 2330  Last Liquid Consumption Date: 03/18/24          History Review:  Patient Active Problem List    Diagnosis Date Noted   • Ductal carcinoma in situ (DCIS) of left breast 01/19/2024       Past Medical History:   Diagnosis Date   • Cancer (HCC) 02/01/2024    left breast   • Depression    • Diabetes (HCC)    • Hearing impairment    • Heart burn    • Hyperlipidemia    • IBS (irritable bowel syndrome)    • PONV (postoperative nausea and vomiting)        Past Surgical History:   Procedure Laterality Date   • HERNIA SURGERY     • HYSTERECTOMY     • NEEDLE BIOPSY LEFT         Medications Prior to Admission   Medication Sig Dispense Refill Last Dose   • atorvastatin 20 MG Oral Tab Take 1 tablet (20 mg total) by mouth daily.   3/17/2024 at 0800   • hydroCHLOROthiazide 12.5 MG Oral Tab Take 1 tablet (12.5 mg total) by mouth daily.   3/17/2024 at 0800   • lisinopril 5 MG Oral Tab Take 1 tablet (5 mg total) by mouth daily.   3/17/2024 at 0800   • metFORMIN 500 MG Oral Tab Take 1 tablet (500 mg total) by mouth 2 (two) times daily with meals.   3/17/2024 at 0800   • SITagliptin Phosphate (JANUVIA) 100 MG Oral Tab Take 1 capsule by mouth daily as needed.   3/17/2024 at 0800   • omeprazole 20 MG Oral Capsule Delayed Release Take 1 capsule (20 mg total) by mouth every morning before breakfast.   3/18/2024 at 0430   • Loratadine (KLS ALLERCLEAR OR) Take 1 tablet by mouth daily.   3/17/2024 at 0800   •  cholecalciferol 25 MCG (1000 UT) Oral Tab Take 1 tablet (1,000 Units total) by mouth daily.   3/17/2024 at 0800     Current Facility-Administered Medications Ordered in Epic   Medication Dose Route Frequency Provider Last Rate Last Admin   • lactated ringers infusion   Intravenous Continuous Mirian Arora MD 20 mL/hr at 03/18/24 0649 New Bag at 03/18/24 0649   • ceFAZolin (Ancef) 2 g in 20mL IV syringe premix  2 g Intravenous Once Mirian Arora MD         No current [facility]-ordered outpatient medications on file.       Allergies   Allergen Reactions   • Aspirin HIVES   • Biaxin [Clarithromycin] HIVES   • Penicillins HIVES     Family states just hives with penicillin   • Sulfadiazine HIVES       Family History   Problem Relation Age of Onset   • Ovarian Cancer Mother 59     Social History     Socioeconomic History   • Marital status:    Tobacco Use   • Smoking status: Never   • Smokeless tobacco: Never   Vaping Use   • Vaping Use: Never used   Substance and Sexual Activity   • Alcohol use: Never   • Drug use: Never       Available pre-op labs reviewed.             Vital Signs:  Body mass index is 34.84 kg/m².   height is 1.626 m (5' 4\") and weight is 92.1 kg (203 lb). Her oral temperature is 98.8 °F (37.1 °C). Her blood pressure is 141/68 and her pulse is 104. Her respiration is 20.   Vitals:    03/12/24 1103 03/18/24 0618   BP:  141/68   Pulse:  104   Resp:  20   Temp:  98.8 °F (37.1 °C)   TempSrc:  Oral   Weight: 92.5 kg (204 lb) 92.1 kg (203 lb)   Height: 1.6 m (5' 3\") 1.626 m (5' 4\")        Anesthesia Evaluation     Patient summary reviewed and Nursing notes reviewed    No history of anesthetic complications   Airway   Mallampati: II  TM distance: >3 FB  Neck ROM: full  Dental      Comment: Patient denies any additional loose, missing, and chipped teeth.      Pulmonary - negative ROS and normal exam    breath sounds clear to auscultation  (-) asthma, shortness of breath, recent URI, sleep apnea   Cardiovascular - normal exam  Exercise tolerance: good  (+) hypertension  (-) pacemaker, valvular problems/murmurs, past MI, CAD, CABG/stent, dysrhythmias, angina, CHF    Rhythm: regular  Rate: normal    Neuro/Psych - negative ROS   (-) seizures, TIA, CVA    GI/Hepatic/Renal - negative ROS   (-) GERD, liver disease, renal disease    Endo/Other    (+) diabetes mellitus type 2 well controlled  (-) blood dyscrasia    Comments: obesity  Abdominal                Anesthesia Plan:   ASA:  2  Plan:   General  Airway:  LMA  Informed Consent Plan and Risks Discussed With:  Patient      I have informed Mercy Ayala and/or legal guardian or family member of the nature of the anesthetic plan, benefits, risks including possible dental damage if relevant, major complications, and any alternative forms of anesthetic management.   All of the patient's questions were answered to the best of my ability. The patient desires the anesthetic management as planned.  Dora Macedo MD  3/18/2024 6:59 AM  Present on Admission:  **None**

## 2024-03-18 NOTE — IMAGING NOTE
0703 Pt  to mammography department     Hx taken, procedure explained, questions answered.  IV patent, no redness or swelling noted at site.    0704 Consent signed and verified      0717 Dr MONTELONGO here . Order verified and signed by all  procedural staff members    0717 Time out taken; site marked LEFT Breast    Scanning completed by Lana mammography technologist     0722  Chloro prep as skin prep to site.  Lidocaine  1% 10 milligrams per ml given as anesthetic affect from kit 3 ml total given.    0723 Ghiatas needle  20 gauge  X 10 cm  placed.  Pt re-imaged, procedure complete.    0725 BB marker to site wire secured to breast strips.  A 4x4 dsg secured  over wire with tape by this RN after images completed .    IV patent, no redness or swelling noted at site.    0753 Transporter arrived to return pt to her room

## 2024-03-18 NOTE — BRIEF OP NOTE
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

## 2024-03-18 NOTE — OPERATIVE REPORT
[facility]    PATIENT'S NAME: JOY ARCE   ATTENDING PHYSICIAN: Mirian Arora MD   OPERATING PHYSICIAN: Mirian Arora MD   PATIENT ACCOUNT#:   [id number]    LOCATION:  [address]  MEDICAL RECORD #:   [id number]       YOB: 1940  ADMISSION DATE:       03/18/2024      OPERATION DATE:  03/18/2024    OPERATIVE REPORT      PREOPERATIVE DIAGNOSIS:  Ductal carcinoma in situ of left breast.   POSTOPERATIVE DIAGNOSIS:  Ductal carcinoma in situ of left breast.  PROCEDURE:  Left breast wire-localized lumpectomy with left breast specimen radiography and left breast advancement flap mastopexy for defect measuring 12 sq cm.    ASSISTANT:  LEANN Tai    ESTIMATED BLOOD LOSS:  5 mL.    DRAINS:  None.    COMPLICATIONS:  None.    DISPOSITION:  Stable on transfer to the recovery room.    INDICATIONS:  The patient is an 83-year-old female.  She has an imaging-detected mass of her left breast with interval enlargement recently with biopsy-confirmed diagnosis of DCIS involving a complex sclerosing lesion.  We discussed local treatment options.  She was motivated for breast conserving approach.  We discussed the need to achieve free margins, the possibility of re-excision to achieve free margins, as well as possible need for postoperative further systemic and/or radiation therapy.  Risks and possible complications were discussed with the patient including, but not limited to, infection, bleeding, injury to surrounding structures, possible need for reoperation.  She agreed to the proposed surgery.    OPERATIVE TECHNIQUE:  Patient brought to the imaging suite where she underwent a wire localization of the area of concern in the left breast.  She was then brought to the OR, placed in supine position, properly padded and secured.  She was given a dose of IV antibiotics and sequential compression devices were applied to her legs for DVT prophylaxis.  General anesthesia was induced.   The left breast was then prepped and draped in the usual sterile fashion.  Lidocaine 1% with epinephrine was used to infiltrate the skin and subcutaneous tissues at the targeted incision site.  A curvilinear incision was made along the inferolateral areolar border with a 15-blade knife in the skin.  The wire was identified, brought in the field, and a segment of breast tissue surrounding the tip of the wire was carefully excised and oriented with a short stitch, single clip superiorly and a long stitch, double clip laterally in order to allow for appropriate pathological margin specimen review.  This was then placed in the imaging device where specimen x-ray confirmed the presence of the targeted mass with the clip and adequate margins as deemed by myself.  Using sharp dissection and electrocautery, 6 margins were then taken from the interior of the lumpectomy cavity, each marked with a clip at true margin, individually labeled, and sent for permanent pathologic evaluation.  Wound was irrigated, hemostasis assured with electrocautery.  Hemoclips were placed around the periphery of the cavity to assist in subsequent surveillance.  She was found have a large defect in the lower central breast measuring at least 12 sq cm thought to impair optimal wound healing and cosmesis for which we proceeded with an advancement flap mastopexy.  This required that I place a counterincision through the immediate adjacent superomedial breast parenchyma down to the level of the pectoralis fascia.  I used a medial vascular pedicle and mobilized this into the aforementioned defect and secured this at the level of the pectoralis fascia with a running 3-0 PDS suture.  Her wound was then closed with interrupted 3-0 Vicryl for deep layer, running 4-0 subcuticular Monocryl for skin.  Mastisol and Steri-Strips were applied.  Marcaine 0.5% was instilled in the cavity to assist with postoperative analgesia.  A sterile dressing and compression  bra were placed.  Blood loss was minimal and all counts correct at the conclusion of the procedure.  She tolerated the procedure well.  She was transferred to the recovery area in stable condition.    Dictated By Mirian Arora MD  d: 03/18/2024 09:50:43  t: 03/18/2024 15:13:11  [facility] [id number]  CMG/    cc: Julio Sosa MD

## 2024-03-18 NOTE — ANESTHESIA POSTPROCEDURE EVALUATION
Patient: Mercy Ayala    Procedure Summary       Date: 03/18/24 Room / Location: [facility] MAIN OR 08 / [facility] MAIN OR    Anesthesia Start: 0846 Anesthesia Stop:     Procedure: Left breast wire localized lumpectomy (Left: Breast) Diagnosis:       Ductal carcinoma in situ of left breast      (Ductal carcinoma in situ of left breast [D05.12])    Surgeons: Mirian Arora MD Anesthesiologist: oDra Macedo MD    Anesthesia Type: general ASA Status: 2            Anesthesia Type: general    Vitals Value Taken Time   /74 03/18/24 0941   Temp 97.5 °F (36.4 °C) 03/18/24 0941   Pulse 94 03/18/24 0941   Resp 19 03/18/24 0941   SpO2 97 % 03/18/24 0941   Vitals shown include unfiled device data.    [facility] AN Post Evaluation:   Patient Evaluated in PACU  Patient Participation: complete - patient participated  Level of Consciousness: awake and alert  Pain Score: 0  Pain Management: adequate  Airway Patency:patent  Dental exam unchanged from preop  Yes    Nausea/Vomiting: none  Cardiovascular Status: acceptable  Respiratory Status: acceptable  Postoperative Hydration acceptable    Alix Johnson CRNA  3/18/2024 9:42 AM

## 2024-03-18 NOTE — H&P
History of Present Illness:   Ms. Mercy Ayala is a 83 year old woman who presents with imaging detected left breast DCIS.  She denies any palpable masses, nipple discharge, skin changes or axillary symptoms.  She does not have a known family history of breast cancer.  She has no personal prior history of breast disease or biopsies.  Her workup was conducted at an outside facility.  She had a bilateral screening mammogram in 2023 that was unremarkable.  She had been followed remotely for a mass in the left breast that was thought to be benign.  She presented for her surveillance ultrasound of this mass on November 15, 2023 and was found to have interval enlargement of this mass up to 2 cm from 1.5 cm prior.  She therefore underwent an ultrasound-guided biopsy on 2024 and was found to have ductal carcinoma in situ measuring up to 5 mm and involving a complex sclerosing lesion that was ER/OH positive.  She is here today for evaluation and recommendations for further therapy.             Past Medical History:   Diagnosis Date    Diabetes (HCC)      Heart burn      Hyperlipidemia      IBS (irritable bowel syndrome)           No past surgical history on file.     Gynecological History:  Pt is a   Pt was 30 years old at time of first pregnancy.    She denies any cumulative breastfeeding history.  She achieved menarche at age 13 and LMP unknown  Age of Menopause: unknown  Type: Hysterectomy with ovaries left in  She denies any history of hormone replacement therapy  She denies any history of oral contraceptive use.  She denies infertility treatment to achieve pregnancy.     Medications:    No outpatient medications have been marked as taking for the 24 encounter (Office Visit) with Mirian Arora MD.         Allergies:    Not on File     Family History:         Family History   Problem Relation Age of Onset    Ovarian Cancer Mother 59         She is not of Ashkenazi [place] ancestry.      Social History:      History   Alcohol use Not on file             History   Smoking Status    Not on file   Smokeless Tobacco    Not on file      Ms. Mercy Ayala is  with 2 children. She has 1 siblings. She is currently Retired        Review of Systems:  General:   The patient denies, fever, chills, night sweats, fatigue, generalized weakness, change in appetite or weight loss.     HEENT:     The patient denies eye irritation, cataracts, redness, glaucoma, yellowing of the eyes, change in vision or color blindness. The patient denies hearing loss, ringing in the ears, ear drainage, earaches, nasal congestion, nose bleeds, snoring, pain in mouth/throat, hoarseness, change in voice, facial trauma.     Respiratory:  The patient denies chronic cough, phlegm, hemoptysis, pleurisy/chest pain, pneumonia, asthma, wheezing, difficulty in breathing with exertion, emphysema, chronic bronchitis, shortness of breath or abnormal sound when breathing.      Cardiovascular:  There is no history of chest pain, chest pressure/discomfort, palpitations, irregular heartbeat, fainting or near-fainting, difficulty breathing when lying flat, SOB/Coughing at night, swelling of the legs or chest pain while walking.     Breasts:  See history of present illness     Gastrointestinal:     There is no history of difficulty or pain with swallowing, reflux symptoms, vomiting, dark or bloody stools, constipation, yellowing of the skin, indigestion, nausea, change in bowel habits, diarrhea, abdominal pain or vomiting blood.      Genitourinary:  The patient denies frequent urination, needing to get up at night to urinate, urinary hesitancy or retaining urine, painful urination, urinary incontinence, decreased urine stream, blood in the urine or vaginal/penile discharge.     Skin:    The patient denies rash, itching, skin lesions, dry skin, change in skin color or change in moles.      Hematologic/Lymphatic:  The patient denies easily  bruising or bleeding or persistent swollen glands or lymph nodes.      Musculoskeletal:  The patient denies muscle aches/pain, joint pain, stiff joints, neck pain, back pain or bone pain.     Neuropsychiatric:  There is no history of migraines or severe headaches, seizure/epilepsy, speech problems, coordination problems, trembling/tremors, fainting/black outs, dizziness, memory problems, loss of sensation/numbness, problems walking, weakness, tingling or burning in hands/feet. There is no history of abusive relationship, bipolar disorder, sleep disturbance, anxiety, depression or feeling of despair.     Endocrine:    There is no history of poor/slow wound healing, weight loss/gain, fertility or hormone problems, cold intolerance, thyroid disease.      Allergic/Immunologic:  There is no history of hives, hay fever, angioedema or anaphylaxis.     /78 (BP Location: Right arm, Patient Position: Sitting, Cuff Size: adult)   Pulse 94   Temp 98 °F (36.7 °C) (Temporal)   Resp 18   Wt 92.5 kg (204 lb)   SpO2 96%      Physical Exam:  The patient is an alert, oriented, well-nourished and  well-developed woman who appears her stated age. Her speech patterns and movements are normal. Her affect is appropriate.     HEENT: The head is normocephalic. The neck is supple. The thyroid is not enlarged and is without palpable masses/nodules. There are no palpable masses. The trachea is in the midline. Conjunctiva are clear, non-icteric.     Chest: The chest expands symmetrically. The lungs are clear to auscultation.     Heart: The rhythm is regular.  There are no murmurs, rubs, gallops or thrills.     Breasts:  Her breasts are symmetrical with a cup size 40B.  Right breast: The skin, nipple ,and areola appear normal. There is no skin dimpling with movement of the pectoralis. There is no nipple retraction. No nipple discharge can be elicited. The parenchyma is mildly nodular. There are no dominant masses in the breast. The  axillary tail is normal.  Left breast:   The skin, nipple, and areola appear normal. There is no skin dimpling with movement of the pectoralis. There is no nipple retraction. No nipple discharge can be elicited. The parenchyma is mildly nodular. There are no dominant masses in the breast. The axillary tail is normal.     Abdomen:  The abdomen is soft, flat and non tender. The liver is not enlarged. There are no palpable masses.     Lymph Nodes:  The supraclavicular, axillary and cervical regions are free of significant lymphadenopathy.     Back: There is no vertebral column tenderness.     Skin: The skin appears normal. There are no suspicious appearing rashes or lesions.     Extremities: The extremities are without deformity, cyanosis or edema.     Impression:   Ms. Mercy Ayala is a 83 year old woman presents with biopsy confirmed left breast DCIS, clinical stage Tis NxMx.     Discussion and Plan:  I had a discussion with the Patient regarding her breast exam. On exam today I found her to be healing well since recent biopsy with no other clinical findings.  I first reviewed the recent imaging and pathology and we discussed this at length.     The natural history and evolution of DCIS was discussed with Ms. Mercy Ayala and her family. This  included the difference between in-situ and invasive carcinoma, and the distinction between  local and systemic disease and local and systemic therapy. For local treatment options, I  explained the risks and benefits of breast conservation and mastectomy (with or without  reconstruction), including the fact that survival rates are essentially equal with these two  approaches. If breast conservation is elected, I explained the need for free margins, the  possibility of re-excision to achieve free margins, and the need for post-operative radiotherapy.  The patient was told that eunice staging is not usually required for DCIS, but is sometimes  recommended depending on the size  and grade of the lesion, and if mastectomy is planned  for treatment. The reasons for this were explained. I explained that for pure DCIS, systemic  therapy is not required, although endocrine agents such as tamoxifen can be used in women  with hormone sensitive disease in order to reduce the risk of local recurrence and future new  primaries  Following this discussion, where all of the patient's questions were answered, we agreed to  proceed with left breast wire localized lumpectomy. The risks and possible complications of the procedure were explained to the patient and her family and she understood and agreed to the proposed plan. She was given ample opportunity for questions and those questions were answered to her satisfaction. She has been  encouraged to contact the office with any questions or concerns prior to her next appointment.     Pre-op Diagnosis: Ductal carcinoma in situ of left breast [D05.12]    The above referenced H&P was reviewed by Mirian Arora MD on 3/18/2024, the patient was examined and no significant changes have occurred in the patient's condition since the H&P was performed.  I discussed with the patient and/or legal representative the potential benefits, risks and side effects of this procedure; the likelihood of the patient achieving goals; and potential problems that might occur during recuperation.  I discussed reasonable alternatives to the procedure, including risks, benefits and side effects related to the alternatives and risks related to not receiving this procedure.  We will proceed with procedure as planned.

## 2024-03-25 ENCOUNTER — OFFICE VISIT (OUTPATIENT)
Dept: SURGERY | Facility: CLINIC | Age: 84
End: 2024-03-25
Payer: MEDICARE

## 2024-03-25 VITALS
TEMPERATURE: 97 F | HEART RATE: 107 BPM | WEIGHT: 206 LBS | RESPIRATION RATE: 20 BRPM | DIASTOLIC BLOOD PRESSURE: 79 MMHG | HEIGHT: 64 IN | BODY MASS INDEX: 35.17 KG/M2 | SYSTOLIC BLOOD PRESSURE: 133 MMHG | OXYGEN SATURATION: 95 %

## 2024-03-25 DIAGNOSIS — D05.12 DUCTAL CARCINOMA IN SITU OF LEFT BREAST: Primary | ICD-10-CM

## 2024-03-25 NOTE — PROGRESS NOTES
Breast Surgery Post-Operative Visit    Diagnosis: DCIS, Left breast, s/p lumpectomy on 3/18/2024    Stage: Cancer Staging   No matching staging information was found for the patient.      Disease Status:  Surgical treatment complete, medical oncology recommendations pending.    History of Present Illness:   Ms. Mercy Ayala is a 83 year old woman who presents with imaging detected left breast DCIS.  She denies any palpable masses, nipple discharge, skin changes or axillary symptoms.  She does not have a known family history of breast cancer.  She has no personal prior history of breast disease or biopsies.  Her workup was conducted at an outside facility.  She had a bilateral screening mammogram in August 2023 that was unremarkable.  She had been followed remotely for a mass in the left breast that was thought to be benign.  She presented for her surveillance ultrasound of this mass on November 15, 2023 and was found to have interval enlargement of this mass up to 2 cm from 1.5 cm prior.  She therefore underwent an ultrasound-guided biopsy on January 4, 2024 and was found to have ductal carcinoma in situ measuring up to 5 mm and involving a complex sclerosing lesion that was ER/AR positive. She underwent lumpectomy, which occurred without complication. She is here for postoperative visit. She reports her pain is under control. She denies erythema, warmth, drainage, or fevers. She has been taking norco for pain.   She is here today for evaluation and recommendations for further therapy.        Past Medical History:   Diagnosis Date    Cancer (HCC) 02/01/2024    left breast    Depression     Diabetes (HCC)     Hearing impairment     Heart burn     Hyperlipidemia     IBS (irritable bowel syndrome)     PONV (postoperative nausea and vomiting)        Past Surgical History:   Procedure Laterality Date    HERNIA SURGERY      HYSTERECTOMY      LUMPECTOMY LEFT  03/18/2024    NEEDLE BIOPSY LEFT         Gynecological  History:  Pt is a   Pt was 30 years old at time of first pregnancy.    She denies any cumulative breastfeeding history.  She achieved menarche at age 13 and LMP unknown  Age of Menopause: unknown  Type: Hysterectomy with ovaries left in  She denies any history of hormone replacement therapy  She denies any history of oral contraceptive use.  She denies infertility treatment to achieve pregnancy.    Medications:     atorvastatin 20 MG Oral Tab Take 1 tablet (20 mg total) by mouth daily.      hydroCHLOROthiazide 12.5 MG Oral Tab Take 1 tablet (12.5 mg total) by mouth daily.      lisinopril 5 MG Oral Tab Take 1 tablet (5 mg total) by mouth daily.      metFORMIN 500 MG Oral Tab Take 1 tablet (500 mg total) by mouth 2 (two) times daily with meals.      SITagliptin Phosphate (JANUVIA) 100 MG Oral Tab Take 1 capsule by mouth daily as needed.      omeprazole 20 MG Oral Capsule Delayed Release Take 1 capsule (20 mg total) by mouth every morning before breakfast.      Loratadine (KLS ALLERCLEAR OR) Take 1 tablet by mouth daily.      cholecalciferol 25 MCG (1000 UT) Oral Tab Take 1 tablet (1,000 Units total) by mouth daily.         Allergies:    Allergies   Allergen Reactions    Aspirin HIVES    Biaxin [Clarithromycin] HIVES    Penicillins HIVES     Family states just hives with penicillin    Sulfadiazine HIVES       Family History:   Family History   Problem Relation Age of Onset    Ovarian Cancer Mother 59       She is not of Ashkenazi Spiritism ancestry.    Social History:  History   Alcohol Use Never       History   Smoking Status    Never   Smokeless Tobacco    Never     Ms. Mercy Ayala is  with 2 children. She has 1 siblings. She is currently Retired      Review of Systems:  General:   The patient denies, fever, chills, night sweats, fatigue, generalized weakness, change in appetite or weight loss.    HEENT:     The patient denies eye irritation, cataracts, redness, glaucoma, yellowing of the eyes, change in  vision or color blindness. The patient denies hearing loss, ringing in the ears, ear drainage, earaches, nasal congestion, nose bleeds, snoring, pain in mouth/throat, hoarseness, change in voice, facial trauma.    Respiratory:  The patient denies chronic cough, phlegm, hemoptysis, pleurisy/chest pain, pneumonia, asthma, wheezing, difficulty in breathing with exertion, emphysema, chronic bronchitis, shortness of breath or abnormal sound when breathing.     Cardiovascular:  There is no history of chest pain, chest pressure/discomfort, palpitations, irregular heartbeat, fainting or near-fainting, difficulty breathing when lying flat, SOB/Coughing at night, swelling of the legs or chest pain while walking.    Breasts:  See history of present illness    Gastrointestinal:     There is no history of difficulty or pain with swallowing, reflux symptoms, vomiting, dark or bloody stools, constipation, yellowing of the skin, indigestion, nausea, change in bowel habits, diarrhea, abdominal pain or vomiting blood.     Genitourinary:  The patient denies frequent urination, needing to get up at night to urinate, urinary hesitancy or retaining urine, painful urination, urinary incontinence, decreased urine stream, blood in the urine or vaginal/penile discharge.    Skin:    The patient denies rash, itching, skin lesions, dry skin, change in skin color or change in moles.     Hematologic/Lymphatic:  The patient denies easily bruising or bleeding or persistent swollen glands or lymph nodes.     Musculoskeletal:  The patient denies muscle aches/pain, joint pain, stiff joints, neck pain, back pain or bone pain.    Neuropsychiatric:  There is no history of migraines or severe headaches, seizure/epilepsy, speech problems, coordination problems, trembling/tremors, fainting/black outs, dizziness, memory problems, loss of sensation/numbness, problems walking, weakness, tingling or burning in hands/feet. There is no history of abusive  relationship, bipolar disorder, sleep disturbance, anxiety, depression or feeling of despair.    Endocrine:    There is no history of poor/slow wound healing, weight loss/gain, fertility or hormone problems, cold intolerance, thyroid disease.     Allergic/Immunologic:  There is no history of hives, hay fever, angioedema or anaphylaxis.    /79 (BP Location: Left arm, Patient Position: Sitting, Cuff Size: adult)   Pulse 107   Temp 97.3 °F (36.3 °C) (Tympanic)   Resp 20   Ht 1.626 m (5' 4\")   Wt 93.4 kg (206 lb)   SpO2 95%   BMI 35.36 kg/m²     Physical Exam:  The patient is an alert, oriented, well-nourished and  well-developed woman who appears her stated age. Her speech patterns and movements are normal. Her affect is appropriate.    HEENT: The head is normocephalic. The neck is supple. The thyroid is not enlarged and is without palpable masses/nodules. There are no palpable masses. The trachea is in the midline. Conjunctiva are clear, non-icteric.    Chest: The chest expands symmetrically. The lungs are clear to auscultation.    Heart: The rhythm is regular.  There are no murmurs, rubs, gallops or thrills.    Breasts:  Her breasts are symmetrical with a cup size 40B.  Right breast: The skin, nipple ,and areola appear normal. There is no skin dimpling with movement of the pectoralis. There is no nipple retraction. No nipple discharge can be elicited. The parenchyma is mildly nodular. There are no dominant masses in the breast. The axillary tail is normal.  Left breast:   The skin, nipple, and areola appear normal. There is no skin dimpling with movement of the pectoralis. There is no nipple retraction. No nipple discharge can be elicited. The parenchyma is mildly nodular. There are no dominant masses in the breast. The axillary tail is normal.    Abdomen:  The abdomen is soft, flat and non tender. The liver is not enlarged. There are no palpable masses.    Lymph Nodes:  The supraclavicular, axillary and  cervical regions are free of significant lymphadenopathy.    Back: There is no vertebral column tenderness.    Skin: The skin appears normal. There are no suspicious appearing rashes or lesions.    Extremities: The extremities are without deformity, cyanosis or edema.    Impression:   Ms. Mercy Ayala is a 83 year old woman presents with biopsy confirmed left breast DCIS, clinical stage Tis NxMx, s/p lumpectomy    Recommendations:   I had a discussion with the Patient and her daughter regarding her breast exam.  She is healing well since surgery with no signs of infection. I reviewed her pathology. She will follow up with Dr. Arora for a second post operative visit. I encouraged her to take tylenol for pain as needed. I encouraged her to continue monitoring her ROM and strength and explained that a referral to physical therapy may be warranted in the future if she identifies any limitations or restrictions. She was given ample opportunity for questions and those questions were answered to her satisfaction. She was encouraged to contact the office with any questions or concerns prior to her next scheduled appointment.

## 2024-03-25 NOTE — PROCEDURES
[facility]  part of [facility]  Procedure Note    Mercy Ayala Patient Status:  Outpatient    1940 MRN [id number]   Location [facility] MAMMOGRAPHY Attending No att. providers found   Hosp Day # 0 PCP Julio Sosa MD     Procedure: mammographic guided wire localization    Pre-Procedure Diagnosis:  left breast venus clip    Post-Procedure Diagnosis: left breast venus clip    Anesthesia:  Local    Findings:  left breast leila clip    Mai Miller, DO  3/25/2024

## 2024-04-03 ENCOUNTER — OFFICE VISIT (OUTPATIENT)
Dept: SURGERY | Facility: CLINIC | Age: 84
End: 2024-04-03
Payer: MEDICARE

## 2024-04-03 ENCOUNTER — NURSE NAVIGATOR ENCOUNTER (OUTPATIENT)
Dept: HEMATOLOGY/ONCOLOGY | Facility: HOSPITAL | Age: 84
End: 2024-04-03

## 2024-04-03 VITALS
TEMPERATURE: 98 F | HEART RATE: 90 BPM | OXYGEN SATURATION: 96 % | HEIGHT: 64 IN | WEIGHT: 199.81 LBS | DIASTOLIC BLOOD PRESSURE: 73 MMHG | RESPIRATION RATE: 19 BRPM | BODY MASS INDEX: 34.11 KG/M2 | SYSTOLIC BLOOD PRESSURE: 127 MMHG

## 2024-04-03 DIAGNOSIS — D05.12 BREAST NEOPLASM, TIS (DCIS), LEFT: Primary | ICD-10-CM

## 2024-04-03 PROCEDURE — 99024 POSTOP FOLLOW-UP VISIT: CPT | Performed by: SURGERY

## 2024-04-03 NOTE — PROGRESS NOTES
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    History of Present Illness:   Ms. Mercy Ayala is a 83 year old woman who presents with imaging detected left breast DCIS.  She denies any palpable masses, nipple discharge, skin changes or axillary symptoms.  She does not have a known family history of breast cancer.  She has no personal prior history of breast disease or biopsies.  Her workup was conducted at an outside facility.  She had a bilateral screening mammogram in 2023 that was unremarkable.  She had been followed remotely for a mass in the left breast that was thought to be benign.  She presented for her surveillance ultrasound of this mass on November 15, 2023 and was found to have interval enlargement of this mass up to 2 cm from 1.5 cm prior.  She therefore underwent an ultrasound-guided biopsy on 2024 and was found to have ductal carcinoma in situ measuring up to 5 mm and involving a complex sclerosing lesion that was ER/ME positive. She underwent lumpectomy, which occurred without complication. She is here for postoperative visit. She reports her pain is under control. She denies erythema, warmth, drainage, or fevers. She is here today for evaluation and recommendations for further therapy.        Past Medical History:   Diagnosis Date    Cancer (HCC) 2024    left breast    Depression     Diabetes (HCC)     Hearing impairment     Heart burn     Hyperlipidemia     IBS (irritable bowel syndrome)     PONV (postoperative nausea and vomiting)        Past Surgical History:   Procedure Laterality Date    HERNIA SURGERY      HYSTERECTOMY      LUMPECTOMY LEFT  2024    NEEDLE BIOPSY LEFT         Gynecological History:  Pt is a   Pt was 30 years old at time of first pregnancy.    She denies any  cumulative breastfeeding history.  She achieved menarche at age 13 and LMP unknown  Age of Menopause: unknown  Type: Hysterectomy with ovaries left in  She denies any history of hormone replacement therapy  She denies any history of oral contraceptive use.  She denies infertility treatment to achieve pregnancy.    Medications:    No outpatient medications have been marked as taking for the 4/3/24 encounter (Appointment) with Mirian Arora MD.       Allergies:    Allergies   Allergen Reactions    Aspirin HIVES    Biaxin [Clarithromycin] HIVES    Penicillins HIVES     Family states just hives with penicillin    Sulfadiazine HIVES       Family History:   Family History   Problem Relation Age of Onset    Ovarian Cancer Mother 59       She is not of Ashkenazi Hinduism ancestry.    Social History:  History   Alcohol Use Never       History   Smoking Status    Never   Smokeless Tobacco    Never     Ms. Mercy Ayala is  with 2 children. She has 1 siblings. She is currently Retired      Review of Systems:  General:   The patient denies, fever, chills, night sweats, fatigue, generalized weakness, change in appetite or weight loss.    HEENT:     The patient denies eye irritation, cataracts, redness, glaucoma, yellowing of the eyes, change in vision or color blindness. The patient denies hearing loss, ringing in the ears, ear drainage, earaches, nasal congestion, nose bleeds, snoring, pain in mouth/throat, hoarseness, change in voice, facial trauma.    Respiratory:  The patient denies chronic cough, phlegm, hemoptysis, pleurisy/chest pain, pneumonia, asthma, wheezing, difficulty in breathing with exertion, emphysema, chronic bronchitis, shortness of breath or abnormal sound when breathing.     Cardiovascular:  There is no history of chest pain, chest pressure/discomfort, palpitations, irregular heartbeat, fainting or near-fainting, difficulty breathing when lying flat, SOB/Coughing at night, swelling of the legs or  chest pain while walking.    Breasts:  See history of present illness    Gastrointestinal:     There is no history of difficulty or pain with swallowing, reflux symptoms, vomiting, dark or bloody stools, constipation, yellowing of the skin, indigestion, nausea, change in bowel habits, diarrhea, abdominal pain or vomiting blood.     Genitourinary:  The patient denies frequent urination, needing to get up at night to urinate, urinary hesitancy or retaining urine, painful urination, urinary incontinence, decreased urine stream, blood in the urine or vaginal/penile discharge.    Skin:    The patient denies rash, itching, skin lesions, dry skin, change in skin color or change in moles.     Hematologic/Lymphatic:  The patient denies easily bruising or bleeding or persistent swollen glands or lymph nodes.     Musculoskeletal:  The patient denies muscle aches/pain, joint pain, stiff joints, neck pain, back pain or bone pain.    Neuropsychiatric:  There is no history of migraines or severe headaches, seizure/epilepsy, speech problems, coordination problems, trembling/tremors, fainting/black outs, dizziness, memory problems, loss of sensation/numbness, problems walking, weakness, tingling or burning in hands/feet. There is no history of abusive relationship, bipolar disorder, sleep disturbance, anxiety, depression or feeling of despair.    Endocrine:    There is no history of poor/slow wound healing, weight loss/gain, fertility or hormone problems, cold intolerance, thyroid disease.     Allergic/Immunologic:  There is no history of hives, hay fever, angioedema or anaphylaxis.    /73 (BP Location: Right arm, Patient Position: Sitting, Cuff Size: large)   Pulse 90   Temp 98.2 °F (36.8 °C) (Temporal)   Resp 19   Ht 1.626 m (5' 4\")   Wt 90.6 kg (199 lb 12.8 oz)   SpO2 96%   BMI 34.30 kg/m²     Physical Exam:  The patient is an alert, oriented, well-nourished and  well-developed woman who appears her stated age. Her  speech patterns and movements are normal. Her affect is appropriate.    HEENT: The head is normocephalic. The neck is supple. The thyroid is not enlarged and is without palpable masses/nodules. There are no palpable masses. The trachea is in the midline. Conjunctiva are clear, non-icteric.    Chest: The chest expands symmetrically. The lungs are clear to auscultation.    Heart: The rhythm is regular.  There are no murmurs, rubs, gallops or thrills.    Breasts:  Her breasts are symmetrical with a cup size 40B.  Right breast: The skin, nipple ,and areola appear normal. There is no skin dimpling with movement of the pectoralis. There is no nipple retraction. No nipple discharge can be elicited. The parenchyma is mildly nodular. There are no dominant masses in the breast. The axillary tail is normal.  Left breast:   The skin, nipple, and areola appear normal. There is no skin dimpling with movement of the pectoralis. There is no nipple retraction. No nipple discharge can be elicited. The parenchyma is mildly nodular. There are no dominant masses in the breast. The axillary tail is normal.  There is a well-healed incision with no signs of infection.    Abdomen:  The abdomen is soft, flat and non tender. The liver is not enlarged. There are no palpable masses.    Lymph Nodes:  The supraclavicular, axillary and cervical regions are free of significant lymphadenopathy.    Back: There is no vertebral column tenderness.    Skin: The skin appears normal. There are no suspicious appearing rashes or lesions.    Extremities: The extremities are without deformity, cyanosis or edema.    Impression:   Ms. Mercy Ayala is a 83 year old woman presents with biopsy confirmed left breast DCIS s/p lumpectomy.    Recommendations:   I had a discussion with the Patient regarding her breast exam.  She is healing well since surgery with no signs of infection. I personally reviewed her pathology.  This confirmed a 1.4 cm area of DCIS with no  invasive component and negative margins.  No further surgery is needed at this time.  We discussed the possible risk of recurrence.  We discussed possible consultation with medical and radiation oncology to review options to reduce the risk of recurrence.  We also discussed that given her age we may ultimately defer any recommendations and that regardless of those decisions she will need close follow-up.  I recommend a bilateral diagnostic evaluation in 6 months with a clinical exam to follow.  She was given ample opportunity for questions and those questions were answered to her satisfaction. She was encouraged to contact the office with any questions or concerns prior to her next scheduled appointment.

## 2024-04-03 NOTE — PROGRESS NOTES
[facility] accompanied by her daughter and son for post operative follow up s/p left breast lumpectomy.  Patient reports doing well.  Patient and family express interest in establishing with Medical Oncology at [facility].  Discussed with patient and family Dr. Pappas and Dr. Snell.  Patient requests first available appointment.  Appointment scheduled with Dr. Snell for Monday April 22nd at pm.    Questions concerning endocrine therapy addressed.  Encouraged patient to discuss further with Dr. Snell during consultation why endocrine therapy is being recommended, how this impacts future risk of recurrence and side effects.  Encouraged patient and family to call or message with any additional questions or concerns.

## 2024-04-06 PROBLEM — D05.12 BREAST NEOPLASM, TIS (DCIS), LEFT: Status: ACTIVE | Noted: 2024-04-06

## 2024-04-22 ENCOUNTER — OFFICE VISIT (OUTPATIENT)
Dept: HEMATOLOGY/ONCOLOGY | Facility: HOSPITAL | Age: 84
End: 2024-04-22
Attending: INTERNAL MEDICINE
Payer: MEDICARE

## 2024-04-22 VITALS
SYSTOLIC BLOOD PRESSURE: 117 MMHG | OXYGEN SATURATION: 95 % | WEIGHT: 202.19 LBS | HEART RATE: 86 BPM | DIASTOLIC BLOOD PRESSURE: 62 MMHG | HEIGHT: 65 IN | BODY MASS INDEX: 33.69 KG/M2 | TEMPERATURE: 98 F | RESPIRATION RATE: 18 BRPM

## 2024-04-22 DIAGNOSIS — D05.12 DUCTAL CARCINOMA IN SITU (DCIS) OF LEFT BREAST: Primary | ICD-10-CM

## 2024-04-22 PROCEDURE — 99204 OFFICE O/P NEW MOD 45 MIN: CPT | Performed by: INTERNAL MEDICINE

## 2024-04-22 NOTE — CONSULTS
Hematology Oncology Consultation Note    Patient Name: Mercy Ayala   YOB: 1940   Medical Record Number: [id number]   CSN: [id number]   Consulting Physician: Princess Snell MD  Referring Physician(s): Julio Sosa MD   Date of Consultation: 2024     Reason for Consultation:    Encounter Diagnoses   Name Primary?    Ductal carcinoma in situ (DCIS) of left breast Yes       History of Present Illness:   Mercy Swanson a 84 year old White female with history of HTN, HLD, DM II, IBS, osteoarthritis, referred for evaluation of screen detected breast cancer.     24: left breast biopsy at [facility]; DCIS, low grade, longest focus 5 mm, ER/OH %   3/18/24: left breast lumpectomy ([facility]): DCIS, intermediate grade, 1.4 cm, negative margins, ER 83%.     Patient is accompanied by her daughter to discuss endocrine therapy options. Feeling well and tolerated surgery with no issues or complaints. She has severe arthritis in the knees and hands. No known history of osteoporosis. She is independent in all ADL's. No prior history of breast biopsies or malignancy.     Performance Status: ECOG 1    Past Medical History:  Past Medical History:    Cancer (HCC)    left breast    Depression    Diabetes (HCC)    Hearing impairment    Heart burn    Hyperlipidemia    IBS (irritable bowel syndrome)    PONV (postoperative nausea and vomiting)     Past Surgical History:  Past Surgical History:   Procedure Laterality Date    Hernia surgery      Hysterectomy      Lumpectomy left  2024    Needle biopsy left         Family Medical History:  Family History   Problem Relation Age of Onset    Ovarian Cancer Mother 59       Gyne History:  OB History    Para Term  AB Living   2 2 0 0 0 0   SAB IAB Ectopic Multiple Live Births   0 0 0 0 0       Psychosocial History:  Social History     Socioeconomic History    Marital status:      Spouse name: Not on file    Number of children: Not on file     Years of education: Not on file    Highest education level: Not on file   Occupational History    Not on file   Tobacco Use    Smoking status: Never    Smokeless tobacco: Never   Vaping Use    Vaping status: Never Used   Substance and Sexual Activity    Alcohol use: Never    Drug use: Never    Sexual activity: Not on file   Other Topics Concern    Not on file   Social History Narrative    Not on file     Social Determinants of Health     Financial Resource Strain: Not on file   Food Insecurity: Not on file   Transportation Needs: Not on file   Physical Activity: Not on file   Stress: Not on file   Social Connections: Not on file   Housing Stability: Not on file       Allergies:   Allergies   Allergen Reactions    Aspirin HIVES    Biaxin [Clarithromycin] HIVES    Penicillins HIVES     Family states just hives with penicillin    Sulfadiazine HIVES       Current Medications:    Current Outpatient Medications:     atorvastatin 20 MG Oral Tab, Take 1 tablet (20 mg total) by mouth daily., Disp: , Rfl:     hydroCHLOROthiazide 12.5 MG Oral Tab, Take 1 tablet (12.5 mg total) by mouth daily., Disp: , Rfl:     lisinopril 5 MG Oral Tab, Take 1 tablet (5 mg total) by mouth daily., Disp: , Rfl:     metFORMIN 500 MG Oral Tab, Take 1 tablet (500 mg total) by mouth 2 (two) times daily with meals., Disp: , Rfl:     SITagliptin Phosphate (JANUVIA) 100 MG Oral Tab, Take 1 capsule by mouth daily as needed., Disp: , Rfl:     omeprazole 20 MG Oral Capsule Delayed Release, Take 1 capsule (20 mg total) by mouth every morning before breakfast., Disp: , Rfl:     Loratadine (KLS ALLERCLEAR OR), Take 1 tablet by mouth daily., Disp: , Rfl:     cholecalciferol 25 MCG (1000 UT) Oral Tab, Take 1 tablet (1,000 Units total) by mouth daily., Disp: , Rfl:     HYDROcodone-acetaminophen 5-325 MG Oral Tab, Take 1-2 tablets by mouth every 6 (six) hours as needed for Pain. (Patient not taking: Reported on 4/3/2024), Disp: 20 tablet, Rfl: 0    Review of  Systems:  A comprehensive 10-point ROS completed all negative unless otherwise documented in HPI.     Vital Signs:  /62 (BP Location: Right arm, Patient Position: Sitting, Cuff Size: large)   Pulse 86   Temp 97.8 °F (36.6 °C) (Oral)   Resp 18   Ht 1.651 m (5' 5\")   Wt 91.7 kg (202 lb 3.2 oz)   SpO2 95%   BMI 33.65 kg/m²     Physical Examination:  General: Alert and oriented x 3, not in acute distress.  HEENT: Non-icteric sclera. Oropharynx is clear.   Neck: No palpable lymphadenopathy. Neck is supple.  Breast: Left breast periareolar lumpectomy incision healing well. No palpable masses or nipple changes in either breast.   Chest: Clear to auscultation.  Heart: Regular rate and rhythm.  Abdomen: Soft, non tender, non distended.  Extremities: No edema.  Neurological: Grossly intact.   Lymphatics: No palpable lymphadenopathy in the cervical, supraclavicular, axillary regions.   Psych/Depression: Appropriate mood and affect.     Radiology:  No results found.     Reviewed outside imaging  Mammogram 12/1/24, diagnostic mammo and US 1/4/24    Pathology:    A. Left breast lumpectomy:   Ductal carcinoma in situ, intermediate nuclear grade, 1.4 cm, (papillary, cribriform , and solid patterns), with associated multifocal calcification.  Previous biopsy site with dense reactive fibrosis, seen within the tumor lesion.  No definitive invasive tumor is identified.  Entire specimen margins are free of malignancy (ductal carcinoma in situ lesion is seen at approximately 0.1 cm from the closest anterior margin).  Fibroadenoma, 0.5 cm, completely excised, seen at < 0.1 mm from specimen anterior margin.  Remaining breast tissue with fibrocystic changes and multifocal microcalcification.  Pathological stage classification (AJCC 8th edition): pTis (DCIS).     B. Left breast, anterior margin; re-excision:   Mainly fibroadipose tissue with vascular congestion.  Entire specimen including specimen inked margins are free of  cytologic atypia or malignancy.       C. Left breast, deep margin; re-excision:   Fibroadipose tissue and benign breast tissue with microcyst formation.  Entire specimen including specimen inked margins are free of cytologic atypia or malignancy.       D. Left breast, medial margin; re-excision:   Fibroadipose tissue and benign breast tissue with stromal fibrosis.  Entire specimen including specimen inked margins are free of cytologic atypia or malignancy.       E. Left breast, lateral margin; re-excision:   Fibroadipose tissue and benign breast tissue with microcyst formation and stromal fibrosis.  Entire specimen including specimen inked margins are free of cytologic atypia or malignancy.       F. Left breast, superior margin; re-excision:   Fibroadipose tissue and benign breast tissue with stromal fibrosis.  Entire specimen including specimen inked margins are free of cytologic atypia or malignancy.        G. Left breast, inferior margin; re-excision:   Fibroadipose tissue and benign breast tissue with stromal fibrosis.  Entire specimen including specimen inked margins are free of cytologic atypia or malignancy.        Comment:   Immunohistochemical stain for myosin performed on blocks A1 and A4, demonstrate the presence of myoepithelial cells in the areas of interest, supporting the above diagnosis.     The entire  left breast lumpectomy (specimen A) is submitted for microscopic examination in 22 cassettes.  The sections demonstrate a mass of ductal carcinoma in situ, measuring 14 mm in greatest dimension, intermediate nuclear grade, grade 2 , seen at approximately 1 mm from the closest anterior margin.  Additional anterior margin of the specimen (specimen B), demonstrate no evidence of atypia or malignancy.  Final margins in the synoptic report are determined by the lumpectomy specimen (part A) and the corresponding additional margins (parts B through G).  Tumor Markers by Immunostaining:  Estrogen receptor  (Leica, monoclonal, clone 6 [id number]) status: 83% (Positive, strong intensity)       Impression & Plan    DCIS, ER/DE positive  84 year old female presenting with low risk DCIS now s/p lumpectomy. Reviewed her imaging and pathology and discussed the natural history and excellent prognosis given ER positivity, small size and negative margins.  Role of proph anti-hormonal therapy (anastrozole or tamoxifen x5 years) discussed with relative risk reduction of invasive and non invasive cancer by 40-50% without a change in survival.  Side effects of AI including fatigue, arthralgias, myalgias, lipid abnormalities and bone density loss reviewed.   However patient is reluctant to proceed, and given that adjuvant anti-hormone therapy has NOT been shown to change overall survival and given her co-morbidities as well as size of the tumor, it is very reasonable to forgo therapy. She will think about AI and let me if interested in a trial.   RT will be omitted given her age and low risk DCIS (low or intermediate grade <2.5 cm).   Surveillance plan discussed with bilateral diagnostic mammogram in 6 months.       Thank you for the opportunity to participate in the care of Mercy Alvarezadrianadanya. Please contact me for any questions or concerns.       Princess Snell MD  Hematology Oncology

## 2024-07-29 ENCOUNTER — TELEPHONE (OUTPATIENT)
Dept: HEMATOLOGY/ONCOLOGY | Facility: HOSPITAL | Age: 84
End: 2024-07-29

## 2024-09-18 ENCOUNTER — TELEPHONE (OUTPATIENT)
Dept: HEMATOLOGY/ONCOLOGY | Facility: HOSPITAL | Age: 84
End: 2024-09-18

## 2024-11-27 ENCOUNTER — OFFICE VISIT (OUTPATIENT)
Age: 84
End: 2024-11-27
Payer: MEDICARE

## 2024-11-27 ENCOUNTER — HOSPITAL ENCOUNTER (OUTPATIENT)
Dept: MAMMOGRAPHY | Facility: HOSPITAL | Age: 84
Discharge: HOME | End: 2024-11-27
Attending: SURGERY
Payer: MEDICARE

## 2024-11-27 VITALS
BODY MASS INDEX: 33 KG/M2 | OXYGEN SATURATION: 94 % | HEART RATE: 81 BPM | WEIGHT: 196.81 LBS | TEMPERATURE: 98 F | RESPIRATION RATE: 18 BRPM | DIASTOLIC BLOOD PRESSURE: 67 MMHG | SYSTOLIC BLOOD PRESSURE: 135 MMHG

## 2024-11-27 DIAGNOSIS — D05.12 BREAST NEOPLASM, TIS (DCIS), LEFT: ICD-10-CM

## 2024-11-27 DIAGNOSIS — D05.12 BREAST NEOPLASM, TIS (DCIS), LEFT: Primary | ICD-10-CM

## 2024-11-27 PROCEDURE — 99213 OFFICE O/P EST LOW 20 MIN: CPT | Performed by: SURGERY

## 2024-11-27 PROCEDURE — 77066 DX MAMMO INCL CAD BI: CPT | Performed by: SURGERY

## 2024-11-27 PROCEDURE — 77062 BREAST TOMOSYNTHESIS BI: CPT | Performed by: SURGERY

## 2024-11-27 NOTE — PROGRESS NOTES
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    History of Present Illness:   Ms. Mercy Ayala is a 84 year old woman who presents with imaging detected left breast DCIS.  She denies any palpable masses, nipple discharge, skin changes or axillary symptoms.  She does not have a known family history of breast cancer.  She has no personal prior history of breast disease or biopsies.  Her workup was conducted at an outside facility.  She had a bilateral screening mammogram in 2023 that was unremarkable.  She had been followed remotely for a mass in the left breast that was thought to be benign.  She presented for her surveillance ultrasound of this mass on November 15, 2023 and was found to have interval enlargement of this mass up to 2 cm from 1.5 cm prior.  She therefore underwent an ultrasound-guided biopsy on 2024 and was found to have ductal carcinoma in situ measuring up to 5 mm and involving a complex sclerosing lesion that was ER/ME positive. She underwent lumpectomy, which occurred without complication. She is here for surveillance with no new clinical concerns.  She had a bilateral diagnostic surveillance on 2024 that showed postlumpectomy changes with no concerns. She is here today for evaluation and recommendations for further therapy.        Past Medical History:    Cancer (HCC)    left breast    Depression    Diabetes (HCC)    Hearing impairment    Heart burn    Hyperlipidemia    IBS (irritable bowel syndrome)    PONV (postoperative nausea and vomiting)       Past Surgical History:   Procedure Laterality Date    Hernia surgery      Hysterectomy      Lumpectomy left  2024    Needle biopsy left         Gynecological History:  Pt is a   Pt was 30 years old at time of first pregnancy.    She denies any  cumulative breastfeeding history.  She achieved menarche at age 13 and LMP unknown  Age of Menopause: unknown  Type: Hysterectomy with ovaries left in  She denies any history of hormone replacement therapy  She denies any history of oral contraceptive use.  She denies infertility treatment to achieve pregnancy.    Medications:     HYDROcodone-acetaminophen 5-325 MG Oral Tab Take 1-2 tablets by mouth every 6 (six) hours as needed for Pain. 20 tablet 0    atorvastatin 20 MG Oral Tab Take 1 tablet (20 mg total) by mouth daily.      hydroCHLOROthiazide 12.5 MG Oral Tab Take 1 tablet (12.5 mg total) by mouth daily.      lisinopril 5 MG Oral Tab Take 1 tablet (5 mg total) by mouth daily.      metFORMIN 500 MG Oral Tab Take 1 tablet (500 mg total) by mouth 4 (four) times daily.      SITagliptin Phosphate (JANUVIA) 100 MG Oral Tab Take 1 capsule by mouth daily as needed.      omeprazole 20 MG Oral Capsule Delayed Release Take 1 capsule (20 mg total) by mouth every morning before breakfast.      Loratadine (KLS ALLERCLEAR OR) Take 1 tablet by mouth daily.      cholecalciferol 25 MCG (1000 UT) Oral Tab Take 1 tablet (1,000 Units total) by mouth daily.         Allergies:    Allergies   Allergen Reactions    Aspirin HIVES    Biaxin [Clarithromycin] HIVES    Penicillins HIVES     Family states just hives with penicillin    Sulfadiazine HIVES       Family History:   Family History   Problem Relation Age of Onset    Ovarian Cancer Mother 59       She is not of Ashkenazi Buddhism ancestry.    Social History:  History   Alcohol Use Never       History   Smoking Status    Never   Smokeless Tobacco    Never     Ms. Mercy Ayala is  with 2 children. She has 1 siblings. She is currently Retired      Review of Systems:  General:   The patient denies, fever, chills, night sweats, fatigue, generalized weakness, change in appetite or weight loss.    HEENT:     The patient denies eye irritation, cataracts, redness, glaucoma,  yellowing of the eyes, change in vision or color blindness. The patient denies hearing loss, ringing in the ears, ear drainage, earaches, nasal congestion, nose bleeds, snoring, pain in mouth/throat, hoarseness, change in voice, facial trauma.    Respiratory:  The patient denies chronic cough, phlegm, hemoptysis, pleurisy/chest pain, pneumonia, asthma, wheezing, difficulty in breathing with exertion, emphysema, chronic bronchitis, shortness of breath or abnormal sound when breathing.     Cardiovascular:  There is no history of chest pain, chest pressure/discomfort, palpitations, irregular heartbeat, fainting or near-fainting, difficulty breathing when lying flat, SOB/Coughing at night, swelling of the legs or chest pain while walking.    Breasts:  See history of present illness    Gastrointestinal:     There is no history of difficulty or pain with swallowing, reflux symptoms, vomiting, dark or bloody stools, constipation, yellowing of the skin, indigestion, nausea, change in bowel habits, diarrhea, abdominal pain or vomiting blood.     Genitourinary:  The patient denies frequent urination, needing to get up at night to urinate, urinary hesitancy or retaining urine, painful urination, urinary incontinence, decreased urine stream, blood in the urine or vaginal/penile discharge.    Skin:    The patient denies rash, itching, skin lesions, dry skin, change in skin color or change in moles.     Hematologic/Lymphatic:  The patient denies easily bruising or bleeding or persistent swollen glands or lymph nodes.     Musculoskeletal:  The patient denies muscle aches/pain, joint pain, stiff joints, neck pain, back pain or bone pain.    Neuropsychiatric:  There is no history of migraines or severe headaches, seizure/epilepsy, speech problems, coordination problems, trembling/tremors, fainting/black outs, dizziness, memory problems, loss of sensation/numbness, problems walking, weakness, tingling or burning in hands/feet. There  is no history of abusive relationship, bipolar disorder, sleep disturbance, anxiety, depression or feeling of despair.    Endocrine:    There is no history of poor/slow wound healing, weight loss/gain, fertility or hormone problems, cold intolerance, thyroid disease.     Allergic/Immunologic:  There is no history of hives, hay fever, angioedema or anaphylaxis.    /67 (BP Location: Right arm, Patient Position: Sitting, Cuff Size: adult)   Pulse 81   Temp 98.2 °F (36.8 °C) (Temporal)   Resp 18   Wt 89.3 kg (196 lb 12.8 oz)   SpO2 94%   BMI 32.75 kg/m²     Physical Exam:  The patient is an alert, oriented, well-nourished and  well-developed woman who appears her stated age. Her speech patterns and movements are normal. Her affect is appropriate.    HEENT: The head is normocephalic. The neck is supple. The thyroid is not enlarged and is without palpable masses/nodules. There are no palpable masses. The trachea is in the midline. Conjunctiva are clear, non-icteric.    Chest: The chest expands symmetrically. The lungs are clear to auscultation.    Heart: The rhythm is regular.  There are no murmurs, rubs, gallops or thrills.    Breasts:  Her breasts are symmetrical with a cup size 40B.  Right breast: The skin, nipple ,and areola appear normal. There is no skin dimpling with movement of the pectoralis. There is no nipple retraction. No nipple discharge can be elicited. The parenchyma is mildly nodular. There are no dominant masses in the breast. The axillary tail is normal.  Left breast:   The skin, nipple, and areola appear normal. There is no skin dimpling with movement of the pectoralis. There is no nipple retraction. No nipple discharge can be elicited. The parenchyma is mildly nodular. There are no dominant masses in the breast. The axillary tail is normal.  There is a well-healed incision with no signs of new or recurrent disease.    Abdomen:  The abdomen is soft, flat and non tender. The liver is not  enlarged. There are no palpable masses.    Lymph Nodes:  The supraclavicular, axillary and cervical regions are free of significant lymphadenopathy.    Back: There is no vertebral column tenderness.    Skin: The skin appears normal. There are no suspicious appearing rashes or lesions.    Extremities: The extremities are without deformity, cyanosis or edema.    Impression:   Ms. Mercy Ayala is a 84 year old woman presents with biopsy confirmed left breast DCIS s/p lumpectomy.    Recommendations:   I had a discussion with the Patient regarding her breast exam.  She is healing well since surgery with no signs of new or recurrent disease.  I reviewed the recent imaging which is concordant with her clinical exam. I personally reviewed her pathology.  This confirmed a 1.4 cm area of DCIS with no invasive component and negative margins.  No further surgery is needed at this time.  We discussed the possible risk of recurrence.  We discussed possible consultation with medical and radiation oncology to review options to reduce the risk of recurrence.  She met with these and declined any further therapy.  She is agreeable to high risk surveillance with a left diagnostic evaluation in May 2025 with a clinical exam to follow. She was given ample opportunity for questions and those questions were answered to her satisfaction. She was encouraged to contact the office with any questions or concerns prior to her next scheduled appointment.     This encounter lasted a total of 25 minutes, more than 50% of which was dedicated to the discussion of management options.

## (undated) DEVICE — CLIP SUR SM TI HRT SHP WIRE HORZ LIG SYS

## (undated) DEVICE — MINOR GENERAL: Brand: MEDLINE INDUSTRIES, INC.

## (undated) DEVICE — DRAPE PACK CHEST

## (undated) DEVICE — SOLUTION IRRIG 1000ML 0.9% NACL USP BTL

## (undated) DEVICE — PAD,ABDOMINAL,8"X7.5",STERILE,LF,1/PK: Brand: MEDLINE

## (undated) DEVICE — YANKAUER,FLEXIBLE HANDLE,REGLR CAPACITY: Brand: MEDLINE INDUSTRIES, INC.

## (undated) DEVICE — SUT COAT VCRL 3-0 27IN SH ABSRB UD 26MM 1/2

## (undated) DEVICE — SUT PERMA- 2-0 18IN FS NABSRB BLK 26MM 3/8

## (undated) DEVICE — DRAPE TAPE: Brand: CONVERTORS

## (undated) DEVICE — ADHESIVE LIQ 2/3ML VI MASTISOL

## (undated) DEVICE — GAMMEX® PI HYBRID SIZE 6.5, STERILE POWDER-FREE SURGICAL GLOVE, POLYISOPRENE AND NEOPRENE BLEND: Brand: GAMMEX

## (undated) DEVICE — CLIP LIG M BLU TI HRT SHP WIRE HORZ

## (undated) DEVICE — SUT MCRYL 4-0 18IN PS-2 ABSRB UD 19MM 3/8 CIR

## (undated) DEVICE — 12 ML SYRINGE LUER-LOCK TIP: Brand: MONOJECT

## (undated) DEVICE — Device: Brand: JELCO

## (undated) DEVICE — BRA SURG ELIZ PINK M

## (undated) NOTE — LETTER
[facility]  [address], [place], [place]  Authorization for Surgical Operation and Procedure                                                                                           I hereby authorize                                                         , MD, my physician and his/her assistants (if applicable), which may include medical students, residents, and/or fellows, to perform the following surgical operation/ procedure and administer such anesthesia as may be determined necessary by my physician: Operation/Procedure name (s) Left breast wire localization on Mercy Ayala   2.   I recognize that during the surgical operation/procedure, unforeseen conditions may necessitate additional or different procedures than those listed above.  I, therefore, further authorize and request that the above-named surgeon, assistants, or designees perform such procedures as are, in their judgment, necessary and desirable.    3.   My surgeon/physician has discussed prior to my surgery the potential benefits, risks and side effects of this procedure; the likelihood of achieving goals; and potential problems that might occur during recuperation.  They also discussed reasonable alternatives to the procedure, including risks, benefits, and side effects related to the alternatives and risks related to not receiving this procedure.  I have had all my questions answered and I acknowledge that no guarantee has been made as to the result that may be obtained.    4.   Should the need arise during my operation/procedure, which includes change of level of care prior to discharge, I also consent to the administration of blood and/or blood products.  Further, I understand that despite careful testing and screening of blood or blood products by collecting agencies, I may still be subject to ill effects as a result of receiving a blood transfusion and/or blood products.  The following are some, but not all, of the  potential risks that can occur: fever and allergic reactions, hemolytic reactions, transmission of diseases such as Hepatitis, AIDS and Cytomegalovirus (CMV) and fluid overload.  In the event that I wish to have an autologous transfusion of my own blood, or a directed donor transfusion, I will discuss this with my physician.  Check only if Refusing Blood or Blood Products  I understand refusal of blood or blood products as deemed necessary by my physician may have serious consequences to my condition to include possible death. I hereby assume responsibility for my refusal and release the hospital, its personnel, and my physicians from any responsibility for the consequences of my refusal.    o  Refuse   5.   I authorize the use of any specimen, organs, tissues, body parts or foreign objects that may be removed from my body during the operation/procedure for diagnosis, research or teaching purposes and their subsequent disposal by hospital authorities.  I also authorize the release of specimen test results and/or written reports to my treating physician on the hospital medical staff or other referring or consulting physicians involved in my care, at the discretion of the Pathologist or my treating physician.    6.   I consent to the photographing or videotaping of the operations or procedures to be performed, including appropriate portions of my body for medical, scientific, or educational purposes, provided my identity is not revealed by the pictures or by descriptive texts accompanying them.  If the procedure has been photographed/videotaped, the surgeon will obtain the original picture, image, videotape or CD.  The hospital will not be responsible for storage, release or maintenance of the picture, image, tape or CD.    7.   I consent to the presence of a  or observers in the operating room as deemed necessary by my physician or their designees.    8.   I recognize that in the event my procedure  results in extended X-Ray/fluoroscopy time, I may develop a skin reaction.    9. If I have a Do Not Attempt Resuscitation (DNAR) order in place, that status will be suspended while in the operating room, procedural suite, and during the recovery period unless otherwise explicitly stated by me (or a person authorized to consent on my behalf). The surgeon or my attending physician will determine when the applicable recovery period ends for purposes of reinstating the DNAR order.  10. Patients having a sterilization procedure: I understand that if the procedure is successful the results will be permanent and it will therefore be impossible for me to inseminate, conceive, or bear children.  I also understand that the procedure is intended to result in sterility, although the result has not been guaranteed.   11. I acknowledge that my physician has explained sedation/analgesia administration to me including the risk and benefits I consent to the administration of sedation/analgesia as may be necessary or desirable in the judgment of my physician.    I CERTIFY THAT I HAVE READ AND FULLY UNDERSTAND THE ABOVE CONSENT TO OPERATION and/or OTHER PROCEDURE.     _________________________________________ _________________________________     ___________________________________  Signature of Patient     Signature of Responsible Person                   Printed Name of Responsible Person                              _________________________________________ ______________________________        ___________________________________  Signature of Witness         Date  Time         Relationship to Patient    STATEMENT OF PHYSICIAN My signature below affirms that prior to the time of the procedure; I have explained to the patient and/or his/her legal representative, the risks and benefits involved in the proposed treatment and any reasonable alternative to the proposed treatment. I have also explained the risks and benefits involved in  refusal of the proposed treatment and alternatives to the proposed treatment and have answered the patient's questions. If I have a significant financial interest in a co-management agreement or a significant financial interest in any product or implant, or other significant relationship used in this procedure/surgery, I have disclosed this and had a discussion with my patient.     _______________________________________________________________ _____________________________  (Signature of Physician)                                                                                         (Date)                                   (Time)  Patient Name: Mercy Ayala    : 1940   Printed: 3/13/2024      Medical Record #: [id number]                                              Page 1 of 1

## (undated) NOTE — LETTER
[facility]  [address], [place], [place]  Authorization for Surgical Operation and Procedure                                                                                           I hereby authorize Mirian Arora MD, my physician and his/her assistants (if applicable), which may include medical students, residents, and/or fellows, to perform the following surgical operation/ procedure and administer such anesthesia as may be determined necessary by my physician: Operation/Procedure name (s) Left breast  lumpectomy on Mercy Ayala   2.   I recognize that during the surgical operation/procedure, unforeseen conditions may necessitate additional or different procedures than those listed above.  I, therefore, further authorize and request that the above-named surgeon, assistants, or designees perform such procedures as are, in their judgment, necessary and desirable.    3.   My surgeon/physician has discussed prior to my surgery the potential benefits, risks and side effects of this procedure; the likelihood of achieving goals; and potential problems that might occur during recuperation.  They also discussed reasonable alternatives to the procedure, including risks, benefits, and side effects related to the alternatives and risks related to not receiving this procedure.  I have had all my questions answered and I acknowledge that no guarantee has been made as to the result that may be obtained.    4.   Should the need arise during my operation/procedure, which includes change of level of care prior to discharge, I also consent to the administration of blood and/or blood products.  Further, I understand that despite careful testing and screening of blood or blood products by collecting agencies, I may still be subject to ill effects as a result of receiving a blood transfusion and/or blood products.  The following are some, but not all, of the potential risks that can occur: fever and  allergic reactions, hemolytic reactions, transmission of diseases such as Hepatitis, AIDS and Cytomegalovirus (CMV) and fluid overload.  In the event that I wish to have an autologous transfusion of my own blood, or a directed donor transfusion, I will discuss this with my physician.  Check only if Refusing Blood or Blood Products  I understand refusal of blood or blood products as deemed necessary by my physician may have serious consequences to my condition to include possible death. I hereby assume responsibility for my refusal and release the hospital, its personnel, and my physicians from any responsibility for the consequences of my refusal.    o  Refuse   5.   I authorize the use of any specimen, organs, tissues, body parts or foreign objects that may be removed from my body during the operation/procedure for diagnosis, research or teaching purposes and their subsequent disposal by hospital authorities.  I also authorize the release of specimen test results and/or written reports to my treating physician on the hospital medical staff or other referring or consulting physicians involved in my care, at the discretion of the Pathologist or my treating physician.    6.   I consent to the photographing or videotaping of the operations or procedures to be performed, including appropriate portions of my body for medical, scientific, or educational purposes, provided my identity is not revealed by the pictures or by descriptive texts accompanying them.  If the procedure has been photographed/videotaped, the surgeon will obtain the original picture, image, videotape or CD.  The hospital will not be responsible for storage, release or maintenance of the picture, image, tape or CD.    7.   I consent to the presence of a  or observers in the operating room as deemed necessary by my physician or their designees.    8.   I recognize that in the event my procedure results in extended X-Ray/fluoroscopy  time, I may develop a skin reaction.    9. If I have a Do Not Attempt Resuscitation (DNAR) order in place, that status will be suspended while in the operating room, procedural suite, and during the recovery period unless otherwise explicitly stated by me (or a person authorized to consent on my behalf). The surgeon or my attending physician will determine when the applicable recovery period ends for purposes of reinstating the DNAR order.  10. Patients having a sterilization procedure: I understand that if the procedure is successful the results will be permanent and it will therefore be impossible for me to inseminate, conceive, or bear children.  I also understand that the procedure is intended to result in sterility, although the result has not been guaranteed.   11. I acknowledge that my physician has explained sedation/analgesia administration to me including the risk and benefits I consent to the administration of sedation/analgesia as may be necessary or desirable in the judgment of my physician.    I CERTIFY THAT I HAVE READ AND FULLY UNDERSTAND THE ABOVE CONSENT TO OPERATION and/or OTHER PROCEDURE.     _________________________________________ _________________________________     ___________________________________  Signature of Patient     Signature of Responsible Person                   Printed Name of Responsible Person                              _________________________________________ ______________________________        ___________________________________  Signature of Witness         Date  Time         Relationship to Patient    STATEMENT OF PHYSICIAN My signature below affirms that prior to the time of the procedure; I have explained to the patient and/or his/her legal representative, the risks and benefits involved in the proposed treatment and any reasonable alternative to the proposed treatment. I have also explained the risks and benefits involved in refusal of the proposed treatment and  alternatives to the proposed treatment and have answered the patient's questions. If I have a significant financial interest in a co-management agreement or a significant financial interest in any product or implant, or other significant relationship used in this procedure/surgery, I have disclosed this and had a discussion with my patient.     _______________________________________________________________ _____________________________  (Signature of Physician)                                                                                         (Date)                                   (Time)  Patient Name: Mercy CORCORAN Jamie    : 1940   Printed: 3/13/2024      Medical Record #: [id number]                                              Page 1 of 1